# Patient Record
Sex: FEMALE | Race: WHITE | NOT HISPANIC OR LATINO | URBAN - METROPOLITAN AREA
[De-identification: names, ages, dates, MRNs, and addresses within clinical notes are randomized per-mention and may not be internally consistent; named-entity substitution may affect disease eponyms.]

---

## 2018-08-13 ENCOUNTER — TRANSFERRED RECORDS (OUTPATIENT)
Dept: HEALTH INFORMATION MANAGEMENT | Facility: CLINIC | Age: 33
End: 2018-08-13

## 2018-08-15 ENCOUNTER — TRANSFERRED RECORDS (OUTPATIENT)
Dept: HEALTH INFORMATION MANAGEMENT | Facility: CLINIC | Age: 33
End: 2018-08-15

## 2018-08-15 ENCOUNTER — MEDICAL CORRESPONDENCE (OUTPATIENT)
Dept: HEALTH INFORMATION MANAGEMENT | Facility: CLINIC | Age: 33
End: 2018-08-15

## 2018-08-28 NOTE — TELEPHONE ENCOUNTER
FUTURE VISIT INFORMATION:    Date: 8/29/18    Time: 1500    Location:  Cardiology  REFERRAL INFORMATION:    Referring provider:  HAZEL Lovell    Referring providers clinic:  [facility]    Reason for visit/diagnosis:              NOTES STATUS DETAILS   OFFICE NOTE from referring provider  Received    OFFICE NOTE from other cardiologist  N/A    DISCHARGE SUMMARY from hospital  N/A    DISCHARGE REPORT from the ER N/A    OPERATIVE REPORT  N/A    MEDICATION LIST Received    LABS     BMP requested    CBC     requested    CMP requested    TSH     requested    Lipids requested    DIAGNOSTIC PROCEDURES     EKG requested    Monitor Reports N/A     N/A    IMAGING (DISC & REPORT)      ECHO  N/A    Stress Tests N/A    Cath N/A    MRI/MRA N/A    CT/CTA N/A     N/A

## 2018-08-29 ENCOUNTER — OFFICE VISIT (OUTPATIENT)
Dept: CARDIOLOGY | Facility: CLINIC | Age: 33
End: 2018-08-29
Attending: INTERNAL MEDICINE
Payer: COMMERCIAL

## 2018-08-29 ENCOUNTER — PRE VISIT (OUTPATIENT)
Dept: CARDIOLOGY | Facility: CLINIC | Age: 33
End: 2018-08-29

## 2018-08-29 VITALS
DIASTOLIC BLOOD PRESSURE: 80 MMHG | WEIGHT: 293 LBS | SYSTOLIC BLOOD PRESSURE: 137 MMHG | OXYGEN SATURATION: 99 % | BODY MASS INDEX: 39.68 KG/M2 | HEART RATE: 89 BPM | HEIGHT: 72 IN

## 2018-08-29 DIAGNOSIS — I10 BENIGN ESSENTIAL HYPERTENSION: Primary | ICD-10-CM

## 2018-08-29 PROCEDURE — 99203 OFFICE O/P NEW LOW 30 MIN: CPT | Mod: ZP | Performed by: INTERNAL MEDICINE

## 2018-08-29 PROCEDURE — G0463 HOSPITAL OUTPT CLINIC VISIT: HCPCS | Mod: ZF

## 2018-08-29 RX ORDER — AMLODIPINE BESYLATE 10 MG/1
10 TABLET ORAL DAILY
Qty: 90 TABLET | Refills: 3 | COMMUNITY
Start: 2018-08-29 | End: 2019-02-07

## 2018-08-29 RX ORDER — SPIRONOLACTONE 100 MG/1
100 TABLET, FILM COATED ORAL DAILY
Qty: 90 TABLET | Refills: 3 | Status: SHIPPED | OUTPATIENT
Start: 2018-08-29 | End: 2018-11-28

## 2018-08-29 RX ORDER — HYDROCHLOROTHIAZIDE 25 MG/1
25 TABLET ORAL DAILY
Qty: 90 TABLET | Refills: 3 | COMMUNITY
Start: 2018-08-29 | End: 2019-02-19

## 2018-08-29 ASSESSMENT — ENCOUNTER SYMPTOMS
ORTHOPNEA: 0
INSOMNIA: 0
PANIC: 0
PALPITATIONS: 0
SYNCOPE: 0
NERVOUS/ANXIOUS: 1
SLEEP DISTURBANCES DUE TO BREATHING: 0
LIGHT-HEADEDNESS: 0
EXERCISE INTOLERANCE: 0
LEG PAIN: 0
HYPOTENSION: 0
HYPERTENSION: 1
DEPRESSION: 1
DECREASED CONCENTRATION: 1

## 2018-08-29 ASSESSMENT — PAIN SCALES - GENERAL: PAINLEVEL: NO PAIN (0)

## 2018-08-29 NOTE — NURSING NOTE
Cardiac Testing: Patient given instructions regarding echocardiogram . Discussed purpose, preparation, procedure and when to expect results reported back to the patient. Patient demonstrated understanding of this information and agreed to call with further questions or concerns.  Labs: Patient was given results of the laboratory testing obtained today. Patient was instructed to return for the next laboratory testing in 3 months. Patient demonstrated understanding of this information and agreed to call with further questions or concerns.   Med Reconcile: Reviewed and verified all current medications with the patient. The updated medication list was printed and given to the patient.  New Medication: Spironolactone 100.  Patient was educated regarding newly prescribed medication, including discussion of  the indication, administration, side effects, and when to report to MD or RN. Patient demonstrated understanding of this information and agreed to call with further questions or concerns.  Medication Change: Stop Lisinopril. Patient was educated regarding prescribed medication change, including discussion of the indication, administration, side effects, and when to report to MD or RN. Patient demonstrated understanding of this information and agreed to call with further questions or concerns.  Patient stated he understood all health information given and agreed to call with further questions or concerns.    Jesica Andrews LPN

## 2018-08-29 NOTE — NURSING NOTE
Chief Complaint   Patient presents with     Hypertension     new pt hypertension      Vitals performed.   Angela Ignacio MA
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
## 2018-08-29 NOTE — PATIENT INSTRUCTIONS
Patient Instructions:  It was a pleasure to see you in the cardiology clinic today.      If you have any questions, you can reach my nurse, Jesica Andrews LPN, at [phone].  Press Option #1 for the [facility], and then press Option #3 for nursing.    We are encouraging the use of [employer] to communicate with your HealthCare Provider    Medication Changes:  - Stop Lisinopril   - Start Spironolactone 100 mg every day.     Studies Ordered: scheduling can be completed at [phone].  - Ultrasound of your kidneys and renal arteries  - Echocardiogram    The results from today include:    Please follow up: With Dr. Sheriff in 3 months.    Sincerely,    Quintin Sheriff MD     If you have an urgent need after hours (8:00 am to 4:30 pm) please call [phone] and ask for the cardiology fellow on call.

## 2018-08-29 NOTE — MR AVS SNAPSHOT
After Visit Summary   8/29/2018    Juan Francisco Richardson    MRN: [id number]           Patient Information     Date Of Birth          1985        Visit Information        Provider Department      8/29/2018 3:00 PM Quintin Sheriff MD [facility]        Today's Diagnoses     Benign essential hypertension    -  1      Care Instructions    Patient Instructions:  It was a pleasure to see you in the cardiology clinic today.      If you have any questions, you can reach my nurse, Jesica Andrews LPN, at [phone].  Press Option #1 for the [facility], and then press Option #3 for nursing.    We are encouraging the use of [employer] to communicate with your HealthCare Provider    Medication Changes:  - Stop Lisinopril   - Start Spironolactone 100 mg every day.     Studies Ordered: scheduling can be completed at [phone].  - Ultrasound of your kidneys and renal arteries  - Echocardiogram    The results from today include:    Please follow up: With Dr. Sheriff in 3 months.    Sincerely,    Quintin Sheriff MD     If you have an urgent need after hours (8:00 am to 4:30 pm) please call [phone] and ask for the cardiology fellow on call.                    Follow-ups after your visit        Additional Services     SLEEP EVALUATION & MANAGEMENT REFERRAL - ADULT -       Please be aware that coverage of these services is subject to the terms and limitations of your health insurance plan.  Call member services at your health plan with any benefit or coverage questions.      Please bring the following to your appointment:    >>   List of current medications   >>   This referral request   >>   Any documents/labs given to you for this referral                Follow-Up with Cardiologist                 Future tests that were ordered for you today     Open Future Orders        Priority Expected Expires Ordered    Comprehensive metabolic panel Routine 9/5/2018 12/4/2018 8/29/2018     "TSH with free T4 reflex Routine 9/5/2018 8/29/2019 8/29/2018    Follow-Up with Cardiologist Routine 11/27/2018 2/25/2019 8/29/2018    SLEEP EVALUATION & MANAGEMENT REFERRAL - ADULT - Routine 9/5/2018 12/4/2018 8/29/2018    Echo Complete Routine 9/28/2018 11/27/2018 8/29/2018    US Renal Complete w Doppler Complete Routine 9/5/2018 8/29/2019 8/29/2018            Who to contact     If you have questions or need follow up information about today's clinic visit or your schedule please contact [facility] directly at [phone].  Normal or non-critical lab and imaging results will be communicated to you by [employer], letter or phone within 4 business days after the clinic has received the results. If you do not hear from us within 7 days, please contact the clinic through [employer] or phone. If you have a critical or abnormal lab result, we will notify you by phone as soon as possible.  Submit refill requests through [employer] or call your pharmacy and they will forward the refill request to us. Please allow 3 business days for your refill to be completed.          Additional Information About Your Visit        [employer] Information     [employer] gives you secure access to your electronic health record. If you see a primary care provider, you can also send messages to your care team and make appointments. If you have questions, please call your primary care clinic.  If you do not have a primary care provider, please call [phone] and they will assist you.        Care EveryWhere ID     This is your Care EveryWhere ID. This could be used by other organizations to access your [facility] medical records  [id number]        Your Vitals Were     Pulse Height Pulse Oximetry BMI (Body Mass Index)          89 2.057 m (6' 9\") 99% 51.54 kg/m2         Blood Pressure from Last 3 Encounters:   08/29/18 137/80    Weight from Last 3 Encounters:   08/29/18 (!) 218.2 kg (481 lb)                 Today's Medication Changes          These " changes are accurate as of 8/29/18  4:21 PM.  If you have any questions, ask your nurse or doctor.               Start taking these medicines.        Dose/Directions    spironolactone 100 MG tablet   Commonly known as:  ALDACTONE   Used for:  Benign essential hypertension   Started by:  Quintin Sheriff MD        Dose:  100 mg   Take 1 tablet (100 mg) by mouth daily   Quantity:  90 tablet   Refills:  3            Where to get your medicines      These medications were sent to [place] [id number]     Phone:  [phone]     spironolactone 100 MG tablet                Primary Care Provider Office Phone # Fax #    Zahira ANDREA Wolf [phone]       [address] [address]        Equal Access to Services     [facility] : Hadii aad ku hadasho Sonadira, waaxda luqadaha, qaybta kaalmada toniyalaura, gerri burroughs. So [facility] [phone].    ATENCIÓN: Si habla español, tiene a walker disposición servicios gratuitos de asistencia lingüística. [facility] [phone].    We comply with applicable federal civil rights laws and [place] laws. We do not discriminate on the basis of race, color, national origin, age, disability, sex, sexual orientation, or gender identity.            Thank you!     Thank you for choosing [facility]  for your care. Our goal is always to provide you with excellent care. Hearing back from our patients is one way we can continue to improve our services. Please take a few minutes to complete the written survey that you may receive in the mail after your visit with us. Thank you!             Your Updated Medication List - Protect others around you: Learn how to safely use, store and throw away your medicines at www.disposemymeds.org.          This list is accurate as of 8/29/18  4:21 PM.  Always use your most recent med list.                    Brand Name Dispense Instructions for use Diagnosis    spironolactone 100 MG tablet    ALDACTONE    90 tablet    Take 1 tablet (100 mg) by mouth daily    Benign essential hypertension
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
## 2018-11-27 ENCOUNTER — RADIANT APPOINTMENT (OUTPATIENT)
Dept: ULTRASOUND IMAGING | Facility: CLINIC | Age: 33
End: 2018-11-27
Attending: INTERNAL MEDICINE
Payer: COMMERCIAL

## 2018-11-27 ENCOUNTER — RADIANT APPOINTMENT (OUTPATIENT)
Dept: CARDIOLOGY | Facility: CLINIC | Age: 33
End: 2018-11-27
Payer: COMMERCIAL

## 2018-11-27 DIAGNOSIS — I10 BENIGN ESSENTIAL HYPERTENSION: ICD-10-CM

## 2018-11-28 ENCOUNTER — OFFICE VISIT (OUTPATIENT)
Dept: CARDIOLOGY | Facility: CLINIC | Age: 33
End: 2018-11-28
Attending: INTERNAL MEDICINE
Payer: COMMERCIAL

## 2018-11-28 VITALS
WEIGHT: 293 LBS | BODY MASS INDEX: 39.68 KG/M2 | SYSTOLIC BLOOD PRESSURE: 147 MMHG | DIASTOLIC BLOOD PRESSURE: 88 MMHG | HEART RATE: 102 BPM | HEIGHT: 72 IN | OXYGEN SATURATION: 99 %

## 2018-11-28 DIAGNOSIS — I10 BENIGN ESSENTIAL HYPERTENSION: Primary | ICD-10-CM

## 2018-11-28 PROCEDURE — G0463 HOSPITAL OUTPT CLINIC VISIT: HCPCS | Mod: ZF

## 2018-11-28 PROCEDURE — 99215 OFFICE O/P EST HI 40 MIN: CPT | Mod: ZP | Performed by: NURSE PRACTITIONER

## 2018-11-28 ASSESSMENT — ENCOUNTER SYMPTOMS
DECREASED CONCENTRATION: 1
NECK PAIN: 1
INSOMNIA: 0
PANIC: 1
MYALGIAS: 0
EXERCISE INTOLERANCE: 0
STIFFNESS: 0
NERVOUS/ANXIOUS: 1
ORTHOPNEA: 0
DEPRESSION: 1
BACK PAIN: 1
JOINT SWELLING: 0
ARTHRALGIAS: 0
PALPITATIONS: 0
MUSCLE CRAMPS: 0
SLEEP DISTURBANCES DUE TO BREATHING: 0
HYPOTENSION: 0
LEG PAIN: 0
HYPERTENSION: 1
LIGHT-HEADEDNESS: 1
MUSCLE WEAKNESS: 0
SYNCOPE: 0

## 2018-11-28 ASSESSMENT — PAIN SCALES - GENERAL: PAINLEVEL: MILD PAIN (3)

## 2018-11-28 NOTE — MR AVS SNAPSHOT
After Visit Summary   2018    Juan Francisco Richardson    MRN: [id number]           Patient Information     Date Of Birth          1985        Visit Information        Provider Department      2018 6:30 PM July Segura NP [facility]        Today's Diagnoses     Benign essential hypertension    -  1      Care Instructions    You were seen today in the Cardiovascular Clinic at the [facility].    Cardiology provider you saw during your visit July Segura NP.     1. Get home BP cuff and log daily BP.  2. Have labs drawn within the next week.  3. Call or message me with BP results and labs in 2 weeks - will give further recommendations based on results.   4. Please make a follow-up appointment in 3 months.     Can get BP cuff at any local pharmacy ([facility]) or [facility] or [facility] in [place].     Questions and [phone].   First press #1 for the University and then press #3 for Medical Questions to reach the Cardiology triage nurse.     On Call Cardiologist for after hours or on weekends: [phone], press option #4 and ask to speak to the on-call Cardiologist.             Follow-ups after your visit        Additional Services     Follow-Up with Cardiologist                 Your next 10 appointments already scheduled     2019  2:00 PM CST   (Arrive by 1:45 PM)   Return Visit with KIR[facility] ([facility])    [address] [place] [address]  [address] [place] [id number] [phone]              Future tests that were ordered for you today     Open Future Orders        Priority Expected Expires Ordered    Follow-Up with Cardiologist Routine 2019    Basic metabolic panel Routine 2018            Who to contact     If you have questions or need follow up information about today's clinic  "visit or your schedule please contact [facility] directly at [phone].  Normal or non-critical lab and imaging results will be communicated to you by MyChart, letter or phone within 4 business days after the clinic has received the results. If you do not hear from us within 7 days, please contact the clinic through [employer] or phone. If you have a critical or abnormal lab result, we will notify you by phone as soon as possible.  Submit refill requests through [facility] or call your pharmacy and they will forward the refill request to us. Please allow 3 business days for your refill to be completed.          Additional Information About Your Visit        [employer] Information     [facility] gives you secure access to your electronic health record. If you see a primary care provider, you can also send messages to your care team and make appointments. If you have questions, please call your primary care clinic.  If you do not have a primary care provider, please call [phone] and they will assist you.        Care EveryWhere ID     This is your Care EveryWhere ID. This could be used by other organizations to access your [facility] medical records  [id number]        Your Vitals Were     Pulse Height Pulse Oximetry BMI (Body Mass Index)          102 2.057 m (6' 9\") 99% 49.94 kg/m2         Blood Pressure from Last 3 Encounters:   11/28/18 147/88   08/29/18 137/80    Weight from Last 3 Encounters:   11/28/18 (!) 211.4 kg (466 lb)   08/29/18 (!) 218.2 kg (481 lb)               Primary Care Provider Office Phone # Fax #    Zahira Wolf [phone]       [address] [address]        Equal Access to Services     [facility] : Hadii francisco Sinha, waaxda luqadaha, qaybta kaalgerri hood. So [facility] [phone].    ATENCIÓN: Si habla español, tiene a walker disposición servicios gratuitos de asistencia lingüística. Llame " al [phone].    We comply with applicable federal civil rights laws and [place] laws. We do not discriminate on the basis of race, color, national origin, age, disability, sex, sexual orientation, or gender identity.            Thank you!     Thank you for choosing [facility]  for your care. Our goal is always to provide you with excellent care. Hearing back from our patients is one way we can continue to improve our services. Please take a few minutes to complete the written survey that you may receive in the mail after your visit with us. Thank you!             Your Updated Medication List - Protect others around you: Learn how to safely use, store and throw away your medicines at www.disposemymeds.org.          This list is accurate as of 11/28/18  6:59 PM.  Always use your most recent med list.                   Brand Name Dispense Instructions for use Diagnosis    amLODIPine 10 MG tablet    NORVASC    90 tablet    Take 1 tablet (10 mg) by mouth daily    Benign essential hypertension       BUSPIRONE HCL PO      Take by mouth 2 times daily        hydrochlorothiazide 25 MG tablet    HYDRODIURIL    90 tablet    Take 1 tablet (25 mg) by mouth daily    Benign essential hypertension       LISINOPRIL PO      Take 40 mg by mouth daily

## 2018-11-28 NOTE — LETTER
11/28/2018      RE: Juan Francisco Richardson  [address] [place] [address]       Dear Colleague,    Thank you for the opportunity to participate in the care of your patient, Juan Francisco Richardson, at the [facility]. Please see a copy of my visit note below.    Cardiology Clinic Note  November 28, 2018      HPI:  Juan Francisco Richardson is a 33 year old with headaches, obesity, severe anxiety, depressive disorder, hyperventilation, gender dysphoria and eating disorder who presents for hypertension follow-up.     He was last evaluated in clinic by Dr. Sheriff 8/2018 and was advised to discontinue lisinopril and start spironolactone 100 mg daily. Patient reports that he took the spironolactone for about 1 month but then began feeling flushed and developed worsening headaches. He also reported increased blood pressure to the 160's. He therefore discontinued the medication and resumed his lisinopril 40 mg daily.     The patient reports feeling very anxious today because he forgot to take his antianxiety medication this morning. From a cardiovascular standpoint he has been feeling okay. He denies significant chest pain, shortness of breath, orthopnea, PND or or weight gain. He states than he ran out of his hydrochlorothiazide a few weeks ago and developed lower extremity swelling which resolved after resuming his hydrochlorothiazide. He reports occasional dizziness with standing, no palpitations or syncope. He does not check his blood pressure at home as he does not have a home BP cuff. He has not had his labs checked due to severe anxiety regarding being poked.     Past medical history:  Past Medical History:   Diagnosis Date     Anxiety      Chronic headache      Depression      Gender dysphoria      Hypertension      Medications:    Current Outpatient Prescriptions on File Prior to Visit:  amLODIPine (NORVASC) 10 MG tablet Take 1 tablet (10 mg) by mouth daily  "  hydrochlorothiazide (HYDRODIURIL) 25 MG tablet Take 1 tablet (25 mg) by mouth daily   spironolactone (ALDACTONE) 100 MG tablet Take 1 tablet (100 mg) by mouth daily (Patient not taking: Reported on 11/28/2018)     No current facility-administered medications on file prior to visit.     Allergies:   Allergies   Allergen Reactions     Red Dye Itching     Family and social history:    No family history on file.    Social History     Social History     Marital status: Single     Spouse name: N/A     Number of children: N/A     Years of education: N/A     Occupational History     Not on file.     Social History Main Topics     Smoking status: Former Smoker     Types: Cigarettes     Smokeless tobacco: Never Used     Alcohol use Yes      Comment: once a month      Drug use: No     Sexual activity: Not on file     Other Topics Concern     Not on file     Social History Narrative     Review of Systems:  Skin: No skin rash or ulcers.  Eyes: No red eye.  Ears/Nose/Throat: No ear discharge, nasal congestion, sore throat or dysphagia.  Respiratory: No cough or hemoptysis.  Cardiovascular: See HPI.    Gastrointestinal: No abdominal pain, nausea, vomiting, hematemesis or melena.  Genitourinary: No increased frequency or urgency of urine. No dysuria or hematuria.  Musculoskeletal: No polyarthralgia or myalgias.  Neurologic: No headaches, seizure or focal weakness.  Psychiatric: No hallucinations.  Hematologic/Lymphatic/Immunologic: No bleeding tendency.  Endocrine: No heat or cold intolerance, abnormal facial hair or alopecia.    Vital signs:  /82 (BP Location: Right arm, Patient Position: Chair, Cuff Size: Adult Large)  Pulse 102  Ht 2.057 m (6' 9\")  Wt (!) 211.4 kg (466 lb)  SpO2 99%  BMI 49.94 kg/m2   Wt Readings from Last 2 Encounters:   11/28/18 (!) 211.4 kg (466 lb)   08/29/18 (!) 218.2 kg (481 lb)       Physical Exam:  Gen: NAD.    HEENT: No conjunctival pallor or scleral icterus, MMM. Clear oropharynx.    Neck: " No JVD. No thyroid enlargement or cervical adenopathy.    Chest: Clear to auscultation bilaterally.    CV: Normal first and second heart sounds. No murmurs or gallop appreciated.    Abdomen: Soft, non-tender, non-distended, BS+.  Ext: No edema. Warm and well perfused with normal capillary refill.    Skin: No skin rash or ulcers.  Neuro: alert, oriented and appropriately conversant.    Psych: Normal affect and speech.    Labs:    No recent labs    Diagnostics:    ECHO 11/27/18:  Interpretation Summary  Global and regional left ventricular function is normal with an EF of 55-60%.  Mild concentric wall thickening consistent with left ventricular hypertrophy  is present.  Global right ventricular function is normal.  No significant valvular abnormalities were noted.  No pericardial effusion is present.    Assessment and Plan:  This is a 33 year old with severe anxiety, depressive disorder, gender dysphoria who presents for ongoing hypertension management. The patient recently discontinued spironolactone due to flushing, headaches and inadequate BP control. He has since resumed lisinopril 40 mg daily and continues to take amlodipine 10 mg and hydrochlorothiazide 25 mg daily. BP is slightly elevated today 147/82, though he extremely anxious. He does not check home BP and has not had recent labs drawn. Recent echo shows normal biventricular function with mild LVH likely secondary to uncontrolled hypertension.     -- Continue current medication regimen for the time being  -- Recommended  24 hour ambulatory BP monitoing but the patient declined stating that he can't tolerate frequent BP checks  -- He will purchase a home BP cuff, log daily BP and call with results in 2 weeks  -- Will also have a BMP drawn at [facility] in [place] next week  -- Based on BP and labs results, will likely add spironolactone 25 mg daily vs Coreg 6.25 mg BID to current regimen    Follow-up: RTC in 3 months.       Sincerely,      July Segura, NP

## 2018-11-29 NOTE — PATIENT INSTRUCTIONS
You were seen today in the Cardiovascular Clinic at the [facility].    Cardiology provider you saw during your visit July Segura NP.     1. Get home BP cuff and log daily BP.  2. Have labs drawn within the next week.  3. Call or message me with BP results and labs in 2 weeks - will give further recommendations based on results.   4. Please make a follow-up appointment in 3 months.     Can get BP cuff at any local pharmacy ([facility]) or [facility] or [facility] in [place].     Questions and [phone].   First press #1 for the University and then press #3 for Medical Questions to reach the Cardiology triage nurse.     On Call Cardiologist for after hours or on weekends: [phone], press option #4 and ask to speak to the on-call Cardiologist.

## 2018-11-29 NOTE — PROGRESS NOTES
Cardiology Clinic Note  November 28, 2018      HPI:  Juan Francisco Richardson is a 33 year old with headaches, obesity, severe anxiety, depressive disorder, hyperventilation, gender dysphoria and eating disorder who presents for hypertension follow-up.     He was last evaluated in clinic by Dr. Sheriff 8/2018 and was advised to discontinue lisinopril and start spironolactone 100 mg daily. Patient reports that he took the spironolactone for about 1 month but then began feeling flushed and developed worsening headaches. He also reported increased blood pressure to the 160's. He therefore discontinued the medication and resumed his lisinopril 40 mg daily.     The patient reports feeling very anxious today because he forgot to take his antianxiety medication this morning. From a cardiovascular standpoint he has been feeling okay. He denies significant chest pain, shortness of breath, orthopnea, PND or or weight gain. He states than he ran out of his hydrochlorothiazide a few weeks ago and developed lower extremity swelling which resolved after resuming his hydrochlorothiazide. He reports occasional dizziness with standing, no palpitations or syncope. He does not check his blood pressure at home as he does not have a home BP cuff. He has not had his labs checked due to severe anxiety regarding being poked.     Past medical history:  Past Medical History:   Diagnosis Date     Anxiety      Chronic headache      Depression      Gender dysphoria      Hypertension      Medications:    Current Outpatient Prescriptions on File Prior to Visit:  amLODIPine (NORVASC) 10 MG tablet Take 1 tablet (10 mg) by mouth daily   hydrochlorothiazide (HYDRODIURIL) 25 MG tablet Take 1 tablet (25 mg) by mouth daily   spironolactone (ALDACTONE) 100 MG tablet Take 1 tablet (100 mg) by mouth daily (Patient not taking: Reported on 11/28/2018)     No current facility-administered medications on file prior to visit.     Allergies:   Allergies   Allergen  "Reactions     Red Dye Itching     Family and social history:    No family history on file.    Social History     Social History     Marital status: Single     Spouse name: N/A     Number of children: N/A     Years of education: N/A     Occupational History     Not on file.     Social History Main Topics     Smoking status: Former Smoker     Types: Cigarettes     Smokeless tobacco: Never Used     Alcohol use Yes      Comment: once a month      Drug use: No     Sexual activity: Not on file     Other Topics Concern     Not on file     Social History Narrative     Review of Systems:  Skin: No skin rash or ulcers.  Eyes: No red eye.  Ears/Nose/Throat: No ear discharge, nasal congestion, sore throat or dysphagia.  Respiratory: No cough or hemoptysis.  Cardiovascular: See HPI.    Gastrointestinal: No abdominal pain, nausea, vomiting, hematemesis or melena.  Genitourinary: No increased frequency or urgency of urine. No dysuria or hematuria.  Musculoskeletal: No polyarthralgia or myalgias.  Neurologic: No headaches, seizure or focal weakness.  Psychiatric: No hallucinations.  Hematologic/Lymphatic/Immunologic: No bleeding tendency.  Endocrine: No heat or cold intolerance, abnormal facial hair or alopecia.    Vital signs:  /82 (BP Location: Right arm, Patient Position: Chair, Cuff Size: Adult Large)  Pulse 102  Ht 2.057 m (6' 9\")  Wt (!) 211.4 kg (466 lb)  SpO2 99%  BMI 49.94 kg/m2   Wt Readings from Last 2 Encounters:   11/28/18 (!) 211.4 kg (466 lb)   08/29/18 (!) 218.2 kg (481 lb)       Physical Exam:  Gen: NAD.    HEENT: No conjunctival pallor or scleral icterus, MMM. Clear oropharynx.    Neck: No JVD. No thyroid enlargement or cervical adenopathy.    Chest: Clear to auscultation bilaterally.    CV: Normal first and second heart sounds. No murmurs or gallop appreciated.    Abdomen: Soft, non-tender, non-distended, BS+.  Ext: No edema. Warm and well perfused with normal capillary refill.    Skin: No skin rash or " ulcers.  Neuro: alert, oriented and appropriately conversant.    Psych: Normal affect and speech.    Labs:    No recent labs    Diagnostics:    ECHO 11/27/18:  Interpretation Summary  Global and regional left ventricular function is normal with an EF of 55-60%.  Mild concentric wall thickening consistent with left ventricular hypertrophy  is present.  Global right ventricular function is normal.  No significant valvular abnormalities were noted.  No pericardial effusion is present.    Assessment and Plan:  This is a 33 year old with severe anxiety, depressive disorder, gender dysphoria who presents for ongoing hypertension management. The patient recently discontinued spironolactone due to flushing, headaches and inadequate BP control. He has since resumed lisinopril 40 mg daily and continues to take amlodipine 10 mg and hydrochlorothiazide 25 mg daily. BP is slightly elevated today 147/82, though he extremely anxious. He does not check home BP and has not had recent labs drawn. Recent echo shows normal biventricular function with mild LVH likely secondary to uncontrolled hypertension.     -- Continue current medication regimen for the time being  -- Recommended  24 hour ambulatory BP monitoing but the patient declined stating that he can't tolerate frequent BP checks  -- He will purchase a home BP cuff, log daily BP and call with results in 2 weeks  -- Will also have a BMP drawn at [facility] in [place] next week  -- Based on BP and labs results, will likely add spironolactone 25 mg daily vs Coreg 6.25 mg BID to current regimen    Follow-up: RTC in 3 months.       Answers for HPI/ROS submitted by the patient on 11/28/2018   General Symptoms: No  Skin Symptoms: No  HENT Symptoms: No  EYE SYMPTOMS: No  HEART SYMPTOMS: Yes  LUNG SYMPTOMS: No  INTESTINAL SYMPTOMS: No  URINARY SYMPTOMS: No  REPRODUCTIVE SYMPTOMS: No  SKELETAL SYMPTOMS: Yes  BLOOD SYMPTOMS: No  NERVOUS SYSTEM SYMPTOMS: No  MENTAL HEALTH  SYMPTOMS: Yes  Chest pain or pressure: No  Fast or irregular heartbeat: No  Pain in legs with walking: No  Trouble breathing while lying down: No  Fingers or toes appear blue: No  High blood pressure: Yes  Low blood pressure: No  Fainting: No  Murmurs: No  Pacemaker: No  Varicose veins: No  Edema or swelling: Yes  Wake up at night with shortness of breath: No  Light-headedness: Yes  Exercise intolerance: No  Back pain: Yes  Muscle aches: No  Neck pain: Yes  Swollen joints: No  Joint pain: No  Bone pain: No  Muscle cramps: No  Muscle weakness: No  Joint stiffness: No  Bone fracture: No  Nervous or Anxious: Yes  Depression: Yes  Trouble sleeping: No  Trouble thinking or concentrating: Yes  Mood changes: No  Panic attacks: Yes

## 2018-11-29 NOTE — NURSING NOTE
Chief Complaint   Patient presents with     Follow Up For     34 y/o for 3 month follow up of HTN     Medications reviewed and vitals performed.  Angela Ignacio CMA

## 2019-02-05 ENCOUNTER — TRANSFERRED RECORDS (OUTPATIENT)
Dept: HEALTH INFORMATION MANAGEMENT | Facility: CLINIC | Age: 34
End: 2019-02-05

## 2019-02-06 ENCOUNTER — CARE COORDINATION (OUTPATIENT)
Dept: CARDIOLOGY | Facility: CLINIC | Age: 34
End: 2019-02-06

## 2019-02-06 RX ORDER — SPIRONOLACTONE 25 MG/1
25 TABLET ORAL DAILY
Qty: 90 TABLET | Refills: 3 | Status: CANCELLED | OUTPATIENT
Start: 2019-02-06 | End: 2020-02-06

## 2019-02-06 NOTE — PROGRESS NOTES
Called patient by phone.  No voicemail set up.  Will attempt to contact patient by phone at a later time.  If not contact will send a letter.  Labs are within normal limits.      Date: 2/6/2019    Time of Call: 4:21 PM     Diagnosis:  Hypertension     [ VORB ] Ordering provider: KERRY Escobar  Order: spironolactone 25 mg once per day     Order received by: Ramos Ruffin RN     Follow-up/additional notes: patient to start on spironolactone 25 mg once per day.  Need to know what pharmacy patient would like it sent to.  Patient should keep follow up appointment on 2/27/19 with KERRY Escobar.  Will attempt follow up at a later date.

## 2019-02-08 NOTE — PROGRESS NOTES
Multiple attempts have been made to contact the patient.  The patient does not have a voicemail that is set up, so no message was able to be left.  The patient is seeing KERRY Escobar on 2/27/19 and the addition of spironolactone will be discussed at that time.

## 2019-02-19 DIAGNOSIS — I10 BENIGN ESSENTIAL HYPERTENSION: ICD-10-CM

## 2019-02-19 RX ORDER — HYDROCHLOROTHIAZIDE 25 MG/1
25 TABLET ORAL DAILY
Qty: 90 TABLET | Refills: 0 | Status: SHIPPED | OUTPATIENT
Start: 2019-02-19 | End: 2019-05-17

## 2019-02-21 NOTE — PROGRESS NOTES
"Cardiology Clinic Note  February 22nd, 2018      HPI:  Juan Francisco Richardson is a 33 year old patient of Dr. Sheriff with a history of headaches, obesity, severe anxiety, depressive disorder, hyperventilation, gender dysphoria and eating disorder who presents for 3 month hypertension follow-up. She was last evaluated 11/2018 and was advised to have labs drawn and check home BP daily. The patient just recently had labs drawn and was started on spironolactone 3 days ago. She already reports feeling better since starting the medication and is no longer experiencing \"whooshing\" in her ears. She denies substernal chest pain, shortness of breath, orthopnea, PND or weight gain. She reports mild leg swelling that is stable. She reports occasional dizziness with standing, no palpitations or syncope. She has been checking brachial blood pressures at home which have been ranging 120-150/. She questions the accuracy of her home blood pressure readings because her diastolic has never been that high in the past. She does not follow a low sodium diet. She is compliant with her medications and denies significant side effects.    Past medical history:  Past Medical History:   Diagnosis Date     Anxiety      Chronic headache      Depression      Gender dysphoria      Hypertension      Medications:    Current Outpatient Medications on File Prior to Visit:  amLODIPine (NORVASC) 10 MG tablet Take 1 tablet (10 mg) by mouth daily   BUSPIRONE HCL PO Take by mouth 2 times daily   hydrochlorothiazide (HYDRODIURIL) 25 MG tablet Take 1 tablet (25 mg) by mouth daily   lisinopril (PRINIVIL/ZESTRIL) 40 MG tablet Take 1 tablet (40 mg) by mouth daily   spironolactone (ALDACTONE) 25 MG tablet Take 1 tablet (25 mg) by mouth daily     No current facility-administered medications on file prior to visit.     Allergies:   Allergies   Allergen Reactions     Red Dye Itching     Family and social history:    No family history on file.    Social History "     Socioeconomic History     Marital status: Single     Spouse name: Not on file     Number of children: Not on file     Years of education: Not on file     Highest education level: Not on file   Occupational History     Not on file   Social Needs     Financial resource strain: Not on file     Food insecurity:     Worry: Not on file     Inability: Not on file     Transportation needs:     Medical: Not on file     Non-medical: Not on file   Tobacco Use     Smoking status: Former Smoker     Types: Cigarettes     Smokeless tobacco: Never Used   Substance and Sexual Activity     Alcohol use: Yes     Comment: once a month      Drug use: No     Sexual activity: Not on file   Lifestyle     Physical activity:     Days per week: Not on file     Minutes per session: Not on file     Stress: Not on file   Relationships     Social connections:     Talks on phone: Not on file     Gets together: Not on file     Attends Islam service: Not on file     Active member of club or organization: Not on file     Attends meetings of clubs or organizations: Not on file     Relationship status: Not on file     Intimate partner violence:     Fear of current or ex partner: Not on file     Emotionally abused: Not on file     Physically abused: Not on file     Forced sexual activity: Not on file   Other Topics Concern     Not on file   Social History Narrative     Not on file     Review of Systems:  Skin: No skin rash or ulcers.  Eyes: No red eye.  Ears/Nose/Throat: No ear discharge, nasal congestion, sore throat or dysphagia.  Respiratory: No cough or hemoptysis.  Cardiovascular: See HPI.    Gastrointestinal: No abdominal pain, nausea, vomiting, hematemesis or melena.  Genitourinary: No increased frequency or urgency of urine. No dysuria or hematuria.  Musculoskeletal: No polyarthralgia or myalgias.  Neurologic: No headaches, seizure or focal weakness.  Psychiatric: No hallucinations.  Hematologic/Lymphatic/Immunologic: No bleeding  "tendency.  Endocrine: No heat or cold intolerance, abnormal facial hair or alopecia.    Vital signs:  Wrist /84  BP (!) 108/0 (BP Location: Right arm, Patient Position: Chair, Cuff Size: Adult Large)   Pulse 98   Ht 2.083 m (6' 10\")   Wt (!) 213.4 kg (470 lb 6.4 oz)   SpO2 98%   BMI 49.19 kg/m     Wt Readings from Last 2 Encounters:   11/28/18 (!) 211.4 kg (466 lb)   08/29/18 (!) 218.2 kg (481 lb)     Physical Exam:  Gen: NAD.    HEENT: No conjunctival pallor or scleral icterus, MMM. Clear oropharynx.    Neck: No JVD. No thyroid enlargement or cervical adenopathy.    Chest: Clear to auscultation bilaterally.    CV: Normal first and second heart sounds. No murmurs or gallop appreciated.    Abdomen: Soft, non-tender, non-distended, BS+.  Ext: No edema. Warm and well perfused with normal capillary refill.    Skin: No skin rash or ulcers.  Neuro: alert, oriented and appropriately conversant.    Psych: Normal affect and speech.    Labs:     Recent BMP:   Creatinine 1.14  Potassium 4.4    Diagnostics:    ECHO 11/27/18:  Interpretation Summary  Global and regional left ventricular function is normal with an EF of 55-60%.  Mild concentric wall thickening consistent with left ventricular hypertrophy  is present.  Global right ventricular function is normal.  No significant valvular abnormalities were noted.  No pericardial effusion is present.    Assessment and Plan:  Juan Francisco Richardson is a 33 year old patient of Dr. Sheriff with a history of headaches, obesity, severe anxiety, depressive disorder, hyperventilation, gender dysphoria and eating disorder who presents for 3 month hypertension follow-up. She is currently tolerating amlodipine 10 mg, HCTZ 25 mg, lisinopril 40 mg daily and sprionolactone 25 mg daily. She started spironolactone 3 days ago and already reports feeling better. Home brachial blood pressures have been ranging 120-150/; however, this was prior to initiation of spironolactone. I also " question the accuracy of these readings due to large upper arm circumference and inability to get a brachial blood pressure in clinic today. Her wrist blood pressure obtained in clinic today is at goal 129/84.     Hypertension, goal BP < 130/80:  -- Continue current medication regimen as spironolactone was initiated 3 days ago  -- Encouraged low sodium diet and daily exercise  -- Recommend daily BP checks on the wrist as this may be more accurate  -- Patient to have BMP drawn at [facility] in 1-2 weeks (only place she can have labs drawn due to anxiety/vasovagal syncope)  -- See PCP in 1 month - if BP remains above goal could consider switching hydrochlorothiazide to more potent thiazide such as chorthalidone 25 mg daily or consider adding carvedilol 6.25 mg BID  -- Return to cardiology clinic in 3 months for ongoing blood pressure management    Follow-up: RTC in 3 months.

## 2019-02-22 ENCOUNTER — OFFICE VISIT (OUTPATIENT)
Dept: CARDIOLOGY | Facility: CLINIC | Age: 34
End: 2019-02-22
Attending: NURSE PRACTITIONER
Payer: MEDICAID

## 2019-02-22 VITALS
DIASTOLIC BLOOD PRESSURE: 84 MMHG | SYSTOLIC BLOOD PRESSURE: 129 MMHG | OXYGEN SATURATION: 98 % | HEART RATE: 98 BPM | WEIGHT: 293 LBS | HEIGHT: 72 IN | BODY MASS INDEX: 39.68 KG/M2

## 2019-02-22 DIAGNOSIS — I10 BENIGN ESSENTIAL HYPERTENSION: Primary | ICD-10-CM

## 2019-02-22 PROCEDURE — 99214 OFFICE O/P EST MOD 30 MIN: CPT | Mod: ZP | Performed by: NURSE PRACTITIONER

## 2019-02-22 ASSESSMENT — MIFFLIN-ST. JEOR: SCORE: 3285.47

## 2019-02-22 ASSESSMENT — PAIN SCALES - GENERAL: PAINLEVEL: MODERATE PAIN (4)

## 2019-02-22 NOTE — PATIENT INSTRUCTIONS
You were seen today in the Cardiovascular Clinic at the [facility].      Cardiology Providers you saw during your visit:   KERRY Escobar    Diagnosis:   Essential Hypertension    BP today: 129/84    Recommendations:    -- Continue current medication regimen with recently added spironolactone  -- Continue to check home blood pressures daily on wrist  -- Have labs drawn at [facility] in 1-2 weeks  -- See PCP in 1 month, if additional antihypertensives are needed consider carvedilol 6.25 mg BID  -- Return to cardiology clinic in 3 months    Follow-up: Dr. Sheriff    For emergencies call 128.    For any scheduling needs, please call [phone].     Thank you for your visit today!     Please call if you have any questions or concerns.  Ramos Ruffin RN

## 2019-02-22 NOTE — LETTER
"2/22/2019      RE: Juan Francisco Richardson  [address] [place] [address]       Dear Colleague,    Thank you for the opportunity to participate in the care of your patient, Juan Francisco Richardson, at the [facility] HEART [facility]. Please see a copy of my visit note below.    Cardiology Clinic Note  February 22nd, 2018      HPI:  Juan Francisco Richardson is a 33 year old patient of Dr. Sheriff with a history of headaches, obesity, severe anxiety, depressive disorder, hyperventilation, gender dysphoria and eating disorder who presents for 3 month hypertension follow-up. She was last evaluated 11/2018 and was advised to have labs drawn and check home BP daily. The patient just recently had labs drawn and was started on spironolactone 3 days ago. She already reports feeling better since starting the medication and is no longer experiencing \"whooshing\" in her ears. She denies substernal chest pain, shortness of breath, orthopnea, PND or weight gain. She reports mild leg swelling that is stable. She reports occasional dizziness with standing, no palpitations or syncope. She has been checking brachial blood pressures at home which have been ranging 120-150/. She questions the accuracy of her home blood pressure readings because her diastolic has never been that high in the past. She does not follow a low sodium diet. She is compliant with her medications and denies significant side effects.    Past medical history:  Past Medical History:   Diagnosis Date     Anxiety      Chronic headache      Depression      Gender dysphoria      Hypertension      Medications:    Current Outpatient Medications on File Prior to Visit:  amLODIPine (NORVASC) 10 MG tablet Take 1 tablet (10 mg) by mouth daily   BUSPIRONE HCL PO Take by mouth 2 times daily   hydrochlorothiazide (HYDRODIURIL) 25 MG tablet Take 1 tablet (25 mg) by mouth daily   lisinopril (PRINIVIL/ZESTRIL) 40 MG tablet Take 1 tablet " (40 mg) by mouth daily   spironolactone (ALDACTONE) 25 MG tablet Take 1 tablet (25 mg) by mouth daily     No current facility-administered medications on file prior to visit.     Allergies:   Allergies   Allergen Reactions     Red Dye Itching     Family and social history:    No family history on file.    Social History     Socioeconomic History     Marital status: Single     Spouse name: Not on file     Number of children: Not on file     Years of education: Not on file     Highest education level: Not on file   Occupational History     Not on file   Social Needs     Financial resource strain: Not on file     Food insecurity:     Worry: Not on file     Inability: Not on file     Transportation needs:     Medical: Not on file     Non-medical: Not on file   Tobacco Use     Smoking status: Former Smoker     Types: Cigarettes     Smokeless tobacco: Never Used   Substance and Sexual Activity     Alcohol use: Yes     Comment: once a month      Drug use: No     Sexual activity: Not on file   Lifestyle     Physical activity:     Days per week: Not on file     Minutes per session: Not on file     Stress: Not on file   Relationships     Social connections:     Talks on phone: Not on file     Gets together: Not on file     Attends Roman Catholic service: Not on file     Active member of club or organization: Not on file     Attends meetings of clubs or organizations: Not on file     Relationship status: Not on file     Intimate partner violence:     Fear of current or ex partner: Not on file     Emotionally abused: Not on file     Physically abused: Not on file     Forced sexual activity: Not on file   Other Topics Concern     Not on file   Social History Narrative     Not on file     Review of Systems:  Skin: No skin rash or ulcers.  Eyes: No red eye.  Ears/Nose/Throat: No ear discharge, nasal congestion, sore throat or dysphagia.  Respiratory: No cough or hemoptysis.  Cardiovascular: See HPI.    Gastrointestinal: No abdominal  "pain, nausea, vomiting, hematemesis or melena.  Genitourinary: No increased frequency or urgency of urine. No dysuria or hematuria.  Musculoskeletal: No polyarthralgia or myalgias.  Neurologic: No headaches, seizure or focal weakness.  Psychiatric: No hallucinations.  Hematologic/Lymphatic/Immunologic: No bleeding tendency.  Endocrine: No heat or cold intolerance, abnormal facial hair or alopecia.    Vital signs:  Wrist /84  BP (!) 108/0 (BP Location: Right arm, Patient Position: Chair, Cuff Size: Adult Large)   Pulse 98   Ht 2.083 m (6' 10\")   Wt (!) 213.4 kg (470 lb 6.4 oz)   SpO2 98%   BMI 49.19 kg/m      Wt Readings from Last 2 Encounters:   11/28/18 (!) 211.4 kg (466 lb)   08/29/18 (!) 218.2 kg (481 lb)     Physical Exam:  Gen: NAD.    HEENT: No conjunctival pallor or scleral icterus, MMM. Clear oropharynx.    Neck: No JVD. No thyroid enlargement or cervical adenopathy.    Chest: Clear to auscultation bilaterally.    CV: Normal first and second heart sounds. No murmurs or gallop appreciated.    Abdomen: Soft, non-tender, non-distended, BS+.  Ext: No edema. Warm and well perfused with normal capillary refill.    Skin: No skin rash or ulcers.  Neuro: alert, oriented and appropriately conversant.    Psych: Normal affect and speech.    Labs:     Recent BMP:   Creatinine 1.14  Potassium 4.4    Diagnostics:    ECHO 11/27/18:  Interpretation Summary  Global and regional left ventricular function is normal with an EF of 55-60%.  Mild concentric wall thickening consistent with left ventricular hypertrophy  is present.  Global right ventricular function is normal.  No significant valvular abnormalities were noted.  No pericardial effusion is present.    Assessment and Plan:  Juan Francisco Richardson is a 33 year old patient of Dr. Sheriff with a history of headaches, obesity, severe anxiety, depressive disorder, hyperventilation, gender dysphoria and eating disorder who presents for 3 month hypertension follow-up. She " is currently tolerating amlodipine 10 mg, HCTZ 25 mg, lisinopril 40 mg daily and sprionolactone 25 mg daily. She started spironolactone 3 days ago and already reports feeling better. Home brachial blood pressures have been ranging 120-150/; however, this was prior to initiation of spironolactone. I also question the accuracy of these readings due to large upper arm circumference and inability to get a brachial blood pressure in clinic today. Her wrist blood pressure obtained in clinic today is at goal 129/84.     Hypertension, goal BP < 130/80:  -- Continue current medication regimen as spironolactone was initiated 3 days ago  -- Encouraged low sodium diet and daily exercise  -- Recommend daily BP checks on the wrist as this may be more accurate  -- Patient to have BMP drawn at [facility] in 1-2 weeks (only place she can have labs drawn due to anxiety/vasovagal syncope)  -- See PCP in 1 month - if BP remains above goal could consider switching hydrochlorothiazide to more potent thiazide such as chorthalidone 25 mg daily or consider adding carvedilol 6.25 mg BID  -- Return to cardiology clinic in 3 months for ongoing blood pressure management    Follow-up: RTC in 3 months.       July Segura NP

## 2019-04-19 DIAGNOSIS — I10 ESSENTIAL HYPERTENSION: ICD-10-CM

## 2019-04-23 ENCOUNTER — MYC REFILL (OUTPATIENT)
Dept: CARDIOLOGY | Facility: CLINIC | Age: 34
End: 2019-04-23

## 2019-04-23 DIAGNOSIS — I10 ESSENTIAL HYPERTENSION: ICD-10-CM

## 2019-04-23 RX ORDER — LISINOPRIL 40 MG/1
TABLET ORAL
Qty: 90 TABLET | Refills: 0 | Status: SHIPPED | OUTPATIENT
Start: 2019-04-23 | End: 2019-05-23 | Stop reason: ALTCHOICE

## 2019-04-23 RX ORDER — LISINOPRIL 40 MG/1
40 TABLET ORAL DAILY
Qty: 90 TABLET | Refills: 0 | Status: CANCELLED | OUTPATIENT
Start: 2019-04-23

## 2019-05-06 DIAGNOSIS — I10 BENIGN ESSENTIAL HYPERTENSION: ICD-10-CM

## 2019-05-08 RX ORDER — AMLODIPINE BESYLATE 10 MG/1
10 TABLET ORAL DAILY
Qty: 90 TABLET | Refills: 3 | Status: SHIPPED | OUTPATIENT
Start: 2019-05-08 | End: 2020-05-05

## 2019-05-08 NOTE — TELEPHONE ENCOUNTER
AMLODIPINE BESYLATE 10 MG TAB  Last Written Prescription Date:   2/7/2019  Last Fill Quantity: 90 ,   # refills: 0  Last Office Visit : 12/17/18  Future Office visit:  5/23/2019    Creatinine and other labs done on 2/5/2019 @ [facility] and scanned into media tab. For this refill protocol, Creatinine within 12 months is required. 2/5/2019: Creatinine =1.14

## 2019-05-17 DIAGNOSIS — I10 BENIGN ESSENTIAL HYPERTENSION: ICD-10-CM

## 2019-05-20 DIAGNOSIS — I10 ESSENTIAL HYPERTENSION: ICD-10-CM

## 2019-05-21 RX ORDER — SPIRONOLACTONE 25 MG/1
25 TABLET ORAL DAILY
Qty: 90 TABLET | Refills: 1 | Status: SHIPPED | OUTPATIENT
Start: 2019-05-21 | End: 2019-05-23

## 2019-05-21 RX ORDER — HYDROCHLOROTHIAZIDE 25 MG/1
25 TABLET ORAL DAILY
Qty: 90 TABLET | Refills: 0 | Status: SHIPPED | OUTPATIENT
Start: 2019-05-21 | End: 2019-05-23

## 2019-05-21 NOTE — TELEPHONE ENCOUNTER
hydrochlorothiazide (HYDRODIURIL) 25 MG tablet  Last Written Prescription Date:  2/19/19  Last Fill Quantity: 90,   # refills: 0  Last Office Visit : 2/22/19  Future Office visit: 5/23/19    Routing refill request to provider for review/approval because: NA, K+, creat  Orders in epic.   Not drawn.

## 2019-05-21 NOTE — TELEPHONE ENCOUNTER
Last Clinic Visit: 2/22/2019  [facility]    *required labs/protocol completed 2/5/19 and results WNL's scanned media-outside provider   Creatinine: 1.14 (0.50-1.35)  Potassium: 4.4 (3.5-5.3)  Sodium: 138 (135-146)

## 2019-05-23 ENCOUNTER — OFFICE VISIT (OUTPATIENT)
Dept: CARDIOLOGY | Facility: CLINIC | Age: 34
End: 2019-05-23
Attending: INTERNAL MEDICINE
Payer: COMMERCIAL

## 2019-05-23 VITALS
HEIGHT: 72 IN | WEIGHT: 293 LBS | OXYGEN SATURATION: 100 % | BODY MASS INDEX: 39.68 KG/M2 | HEART RATE: 79 BPM | SYSTOLIC BLOOD PRESSURE: 117 MMHG | DIASTOLIC BLOOD PRESSURE: 78 MMHG

## 2019-05-23 DIAGNOSIS — Z13.220 LIPID SCREENING: Primary | ICD-10-CM

## 2019-05-23 DIAGNOSIS — I10 BENIGN ESSENTIAL HYPERTENSION: ICD-10-CM

## 2019-05-23 DIAGNOSIS — I10 ESSENTIAL HYPERTENSION: ICD-10-CM

## 2019-05-23 PROCEDURE — 99214 OFFICE O/P EST MOD 30 MIN: CPT | Mod: ZP | Performed by: INTERNAL MEDICINE

## 2019-05-23 RX ORDER — SPIRONOLACTONE 50 MG/1
50 TABLET, FILM COATED ORAL DAILY
Qty: 90 TABLET | Refills: 3 | Status: SHIPPED | OUTPATIENT
Start: 2019-05-23 | End: 2020-06-29

## 2019-05-23 RX ORDER — HYDROCHLOROTHIAZIDE 25 MG/1
25 TABLET ORAL DAILY
Qty: 90 TABLET | Refills: 3 | Status: SHIPPED | OUTPATIENT
Start: 2019-05-23 | End: 2020-05-18

## 2019-05-23 RX ORDER — GABAPENTIN 100 MG/1
100 CAPSULE ORAL 3 TIMES DAILY
COMMUNITY

## 2019-05-23 ASSESSMENT — PAIN SCALES - GENERAL: PAINLEVEL: NO PAIN (0)

## 2019-05-23 ASSESSMENT — MIFFLIN-ST. JEOR: SCORE: 3193.6

## 2019-05-23 NOTE — LETTER
"5/23/2019      RE: Juan Francisco Richardson  [address] [place] [address]       Dear Colleague,    Thank you for the opportunity to participate in the care of your patient, Juan Francisco Richardson, at the [facility] Please see a copy of my visit note below.    HPI:     Patient Juan Francisco Richardson is a 34 year  patient with a history of headaches, obesity, severe anxiety, depressive disorder, hyperventilation, gender dysphoria and eating disorder who presents  follow-up.   Patient reports feeling better since starting the medication and is no longer experiencing \"whooshing\" in her ears. Patient denies substernal chest pain, shortness of breath, orthopnea, PND  Patient reports mild leg swelling that is stable.  Patient  is compliant with her medications and denies significant side effects.         PAST MEDICAL HISTORY:  Past Medical History:   Diagnosis Date     Anxiety      Chronic headache      Depression      Gender dysphoria      Hypertension        CURRENT MEDICATIONS:  Current Outpatient Medications   Medication Sig Dispense Refill     amLODIPine (NORVASC) 10 MG tablet Take 1 tablet (10 mg) by mouth daily 90 tablet 3     gabapentin (NEURONTIN) 100 MG capsule Take 100 mg by mouth 3 times daily       hydrochlorothiazide (HYDRODIURIL) 25 MG tablet Take 1 tablet (25 mg) by mouth daily .  Please be seen as scheduled for further refills. 90 tablet 0     lisinopril (PRINIVIL/ZESTRIL) 40 MG tablet TAKE 1 TABLET BY MOUTH EVERY DAY 90 tablet 0     spironolactone (ALDACTONE) 25 MG tablet Take 1 tablet (25 mg) by mouth daily 90 tablet 1     BUSPIRONE HCL PO Take by mouth 2 times daily         PAST SURGICAL HISTORY:  No past surgical history on file.    ALLERGIES     Allergies   Allergen Reactions     Red Dye Itching       FAMILY HISTORY:  No family history on file.    SOCIAL HISTORY:  Social History     Socioeconomic History     Marital status: Single     Spouse " "name: None     Number of children: None     Years of education: None     Highest education level: None   Occupational History     None   Social Needs     Financial resource strain: None     Food insecurity:     Worry: None     Inability: None     Transportation needs:     Medical: None     Non-medical: None   Tobacco Use     Smoking status: Former Smoker     Types: Cigarettes     Smokeless tobacco: Never Used   Substance and Sexual Activity     Alcohol use: Yes     Comment: once a month      Drug use: No     Sexual activity: None   Lifestyle     Physical activity:     Days per week: None     Minutes per session: None     Stress: None   Relationships     Social connections:     Talks on phone: None     Gets together: None     Attends Hinduism service: None     Active member of club or organization: None     Attends meetings of clubs or organizations: None     Relationship status: None     Intimate partner violence:     Fear of current or ex partner: None     Emotionally abused: None     Physically abused: None     Forced sexual activity: None   Other Topics Concern     None   Social History Narrative     None       ROS:   Constitutional: No fever, chills, or sweats. No weight gain/loss   ENT: No visual disturbance, ear ache, epistaxis, sore throat  Allergies/Immunologic: Negative.   Respiratory: No cough, hemoptysia  Cardiovascular: As per HPI  GI: No nausea, vomiting, hematemesis, melena, or hematochezia  : No urinary frequency, dysuria, or hematuria  Integument: Negative  Psychiatric: Negative  Neuro: Negative  Endocrinology: Negative   Musculoskeletal: Negative    EXAM:  /78   Pulse 79   Ht 2.07 m (6' 9.5\")   Wt (!) 205.5 kg (453 lb)   SpO2 100%   BMI 47.95 kg/m     In general, the patient is a pleasant male in no apparent distress.    HEENT: NC/AT.  PERRLA.  EOMI.  Sclerae white, not injected.  Nares clear.  Pharynx without erythema or exudate.  Dentition intact.    Neck: No adenopathy.  No " thyromegaly. Carotids +4/4 bilaterally without bruits.  No jugular venous distension.   Heart: RRR. Normal S1, S2 splits physiologically. No murmur, rub, click, or gallop. The PMI is in the 5th ICS in the midclavicular line. There is no heave.    Lungs: CTA.  No ronchi, wheezes, rales.  No dullness to percussion.   Abdomen: Soft, nontender, nondistended. No organomegaly.  No bruits.   Extremities: No clubbing, cyanosis, or edema.  The pulses are +4/4 at the radial, brachial, femoral, popliteal, DP, and PT sites bilaterally.  No bruits are noted.  Neurologic: Alert and oriented to person/place/time, normal speech, gait and affect  Skin: No petechiae, purpura or rash.    Labs:  LIPID RESULTS:  No results found for: CHOL, HDL, LDL, TRIG, CHOLHDLRATIO, NHDL    LIVER ENZYME RESULTS:  No results found for: AST, ALT    CBC RESULTS:  No results found for: WBC, RBC, HGB, HCT, MCV, MCH, MCHC, RDW, PLT    BMP RESULTS:  No results found for: NA, POTASSIUM, CHLORIDE, CO2, ANIONGAP, GLC, BUN, CR, GFRESTIMATED, GFRESTBLACK, RAÚL     A1C RESULTS:  No results found for: A1C    INR RESULTS:  No results found for: INR    Procedures:      Assessment and Plan:     We discussed the results with patient:  We discussed the importance of a heart healthy lifestyle and weight loss.    We discussed  following changes in her medication and gave following written instructions:    Medication Changes:   - Increase Spironolactone to 50 mg every day  - Stop Lisinopril - because had some irritation in throat and a cough from time to time  - Start Amlodipine 10 mg at bedtime.  If SBP (top number) is less than 120 mmHg, only take 5 mg.     Recommendations:  Complete labs in one week at [facility].     Studies Ordered: None     The results from today include: None.     Please follow up: With Dr. Sheriff in one year with fasting labs prior    Quintin Sheriff MD, PhD  Professor of Medicine  Division of Cardiology      CC  Patient Care  Team:  Zahira Wolf NP as PCP - General  Zahira Wolf NP as Referring Physician  Juan Forte MD as MD (Cardiology)  [place]

## 2019-05-23 NOTE — PATIENT INSTRUCTIONS
Patient Instructions:  It was a pleasure to see you in the cardiology clinic today.      If you have any questions, you can reach my nurse, Jesica Andrews LPN, at [phone].  Press Option #1 for the [facility], and then press Option #3 for nursing.    We are encouraging the use of [employer] to communicate with your HealthCare Provider    Medication Changes:   - Increase Spironolactone to 50 mg every day  - Stop Lisinopril  - Start Amlodipine 10 mg at bedtime.  If SBP (top number) is less than 120 mmHg, only take 5 mg.    Recommendations:  Complete labs in one week at [facility].    Studies Ordered: None    The results from today include: None.    Please follow up: With Dr. Sheriff in one year with fasting labs prior.    Sincerely,    Quintin Sheriff MD     If you have an urgent need after hours (8:00 am to 4:30 pm) please call [phone] and ask for the cardiology fellow on call.  
progress slowly

## 2019-05-23 NOTE — PROGRESS NOTES
"HPI:     Patient Juan Francisco Richardson is a 34 year  patient with a history of headaches, obesity, severe anxiety, depressive disorder, hyperventilation, gender dysphoria and eating disorder who presents  follow-up.   Patient reports feeling better since starting the medication and is no longer experiencing \"whooshing\" in her ears. Patient denies substernal chest pain, shortness of breath, orthopnea, PND  Patient reports mild leg swelling that is stable.  Patient  is compliant with her medications and denies significant side effects.         PAST MEDICAL HISTORY:  Past Medical History:   Diagnosis Date     Anxiety      Chronic headache      Depression      Gender dysphoria      Hypertension        CURRENT MEDICATIONS:  Current Outpatient Medications   Medication Sig Dispense Refill     amLODIPine (NORVASC) 10 MG tablet Take 1 tablet (10 mg) by mouth daily 90 tablet 3     gabapentin (NEURONTIN) 100 MG capsule Take 100 mg by mouth 3 times daily       hydrochlorothiazide (HYDRODIURIL) 25 MG tablet Take 1 tablet (25 mg) by mouth daily .  Please be seen as scheduled for further refills. 90 tablet 0     lisinopril (PRINIVIL/ZESTRIL) 40 MG tablet TAKE 1 TABLET BY MOUTH EVERY DAY 90 tablet 0     spironolactone (ALDACTONE) 25 MG tablet Take 1 tablet (25 mg) by mouth daily 90 tablet 1     BUSPIRONE HCL PO Take by mouth 2 times daily         PAST SURGICAL HISTORY:  No past surgical history on file.    ALLERGIES     Allergies   Allergen Reactions     Red Dye Itching       FAMILY HISTORY:  No family history on file.    SOCIAL HISTORY:  Social History     Socioeconomic History     Marital status: Single     Spouse name: None     Number of children: None     Years of education: None     Highest education level: None   Occupational History     None   Social Needs     Financial resource strain: None     Food insecurity:     Worry: None     Inability: None     Transportation needs:     Medical: None     Non-medical: None   Tobacco Use " "    Smoking status: Former Smoker     Types: Cigarettes     Smokeless tobacco: Never Used   Substance and Sexual Activity     Alcohol use: Yes     Comment: once a month      Drug use: No     Sexual activity: None   Lifestyle     Physical activity:     Days per week: None     Minutes per session: None     Stress: None   Relationships     Social connections:     Talks on phone: None     Gets together: None     Attends [employer] service: None     Active member of club or organization: None     Attends meetings of clubs or organizations: None     Relationship status: None     Intimate partner violence:     Fear of current or ex partner: None     Emotionally abused: None     Physically abused: None     Forced sexual activity: None   Other Topics Concern     None   Social History Narrative     None       ROS:   Constitutional: No fever, chills, or sweats. No weight gain/loss   ENT: No visual disturbance, ear ache, epistaxis, sore throat  Allergies/Immunologic: Negative.   Respiratory: No cough, hemoptysia  Cardiovascular: As per HPI  GI: No nausea, vomiting, hematemesis, melena, or hematochezia  : No urinary frequency, dysuria, or hematuria  Integument: Negative  Psychiatric: Negative  Neuro: Negative  Endocrinology: Negative   Musculoskeletal: Negative    EXAM:  /78   Pulse 79   Ht 2.07 m (6' 9.5\")   Wt (!) 205.5 kg (453 lb)   SpO2 100%   BMI 47.95 kg/m    In general, the patient is a pleasant male in no apparent distress.    HEENT: NC/AT.  PERRLA.  EOMI.  Sclerae white, not injected.  Nares clear.  Pharynx without erythema or exudate.  Dentition intact.    Neck: No adenopathy.  No thyromegaly. Carotids +4/4 bilaterally without bruits.  No jugular venous distension.   Heart: RRR. Normal S1, S2 splits physiologically. No murmur, rub, click, or gallop. The PMI is in the 5th ICS in the midclavicular line. There is no heave.    Lungs: CTA.  No ronchi, wheezes, rales.  No dullness to percussion.   Abdomen: Soft, " nontender, nondistended. No organomegaly.  No bruits.   Extremities: No clubbing, cyanosis, or edema.  The pulses are +4/4 at the radial, brachial, femoral, popliteal, DP, and PT sites bilaterally.  No bruits are noted.  Neurologic: Alert and oriented to person/place/time, normal speech, gait and affect  Skin: No petechiae, purpura or rash.    Labs:  LIPID RESULTS:  No results found for: CHOL, HDL, LDL, TRIG, CHOLHDLRATIO, NHDL    LIVER ENZYME RESULTS:  No results found for: AST, ALT    CBC RESULTS:  No results found for: WBC, RBC, HGB, HCT, MCV, MCH, MCHC, RDW, PLT    BMP RESULTS:  No results found for: NA, POTASSIUM, CHLORIDE, CO2, ANIONGAP, GLC, BUN, CR, GFRESTIMATED, GFRESTBLACK, RAÚL     A1C RESULTS:  No results found for: A1C    INR RESULTS:  No results found for: INR    Procedures:      Assessment and Plan:     We discussed the results with patient:  We discussed the importance of a heart healthy lifestyle and weight loss.    We discussed  following changes in her medication and gave following written instructions:    Medication Changes:   - Increase Spironolactone to 50 mg every day  - Stop Lisinopril - because had some irritation in throat and a cough from time to time  - Start Amlodipine 10 mg at bedtime.  If SBP (top number) is less than 120 mmHg, only take 5 mg.     Recommendations:  Complete labs in one week at [facility].     Studies Ordered: None     The results from today include: None.     Please follow up: With Dr. Sheriff in one year with fasting labs prior    Quintin Sheriff MD, PhD  Professor of Medicine  Division of Cardiology      CC  Patient Care Team:  Zahira Wolf NP as PCP - General  Zahira Wolf NP as Referring Physician  Juan Forte MD as MD (Cardiology)  [place]

## 2019-05-23 NOTE — NURSING NOTE
Labs: Patient was given results of the laboratory testing obtained today. Patient was instructed to return for the next laboratory testing in one week and again in one year. Patient demonstrated understanding of this information and agreed to call with further questions or concerns.   Med Reconcile: Reviewed and verified all current medications with the patient. The updated medication list was printed and given to the patient.  Return Appointment: Patient given instructions regarding scheduling next clinic visit. Patient demonstrated understanding of this information and agreed to call with further questions or concerns.  Medication Change: Patient was educated regarding prescribed medication change, including discussion of the indication, administration, side effects, and when to report to MD or RN. Patient demonstrated understanding of this information and agreed to call with further questions or concerns.  Patient stated he understood all health information given and agreed to call with further questions or concerns.    Jesica Andrews LPN

## 2019-08-12 ENCOUNTER — DOCUMENTATION ONLY (OUTPATIENT)
Dept: CARDIOLOGY | Facility: CLINIC | Age: 34
End: 2019-08-12

## 2019-08-12 NOTE — PROGRESS NOTES
Informed Jesica Andrews who is Dr. Sheriff nurse about this message. Jesica stated that she will take care of this for the patient.    Kimberly Leavitt MA  Cardiology [facility]

## 2019-11-18 DIAGNOSIS — I10 BENIGN ESSENTIAL HYPERTENSION: ICD-10-CM

## 2019-11-19 NOTE — TELEPHONE ENCOUNTER
"  LISINOPRIL 40 MG TABLET     Last Written Prescription Date:  8/12/2019  Last Fill Quantity: 90,   # refills: 0  Last Office Visit : 5/23/2019  Future Office visit:  None    Routing refill request to provider for review/approval because:  No Cr and K on file. Ordered and reviewed as below Anat from Herl[facility] OLIVARES, no results in CareEverywhere or media tab. PCP @ [facility].  8/11/2019:\"   Were you ever able to complete labs after [facility]?  Dr Sheriff was requesting to have a basic metabolic panel drawn.  If you haven't done so, we will need to have that completed; otherwise, if you could request results be sent to [phone]\"      "

## 2019-11-20 ENCOUNTER — MYC REFILL (OUTPATIENT)
Dept: CARDIOLOGY | Facility: CLINIC | Age: 34
End: 2019-11-20

## 2019-11-20 DIAGNOSIS — I10 BENIGN ESSENTIAL HYPERTENSION: ICD-10-CM

## 2019-11-20 RX ORDER — LISINOPRIL 40 MG/1
40 TABLET ORAL DAILY
Qty: 90 TABLET | Refills: 0 | Status: CANCELLED | OUTPATIENT
Start: 2019-11-20

## 2019-11-22 RX ORDER — LISINOPRIL 40 MG/1
40 TABLET ORAL DAILY
Qty: 90 TABLET | Refills: 1 | Status: SHIPPED | OUTPATIENT
Start: 2019-11-22 | End: 2020-05-18

## 2020-01-09 LAB
BUN SERPL-MCNC: 20 MG/DL
CALCIUM SERPL-MCNC: 10.1 MG/DL
CHLORIDE SERPLBLD-SCNC: 100 MMOL/L
CO2 SERPL-SCNC: 27 MMOL/L
CREAT SERPL-MCNC: 1.27 MG/DL
GFR SERPL CREATININE-BSD FRML MDRD: 74 ML/MIN/1.73M2
GLUCOSE SERPL-MCNC: 83 MG/DL (ref 70–99)
POTASSIUM SERPL-SCNC: 4.8 MMOL/L
SODIUM SERPL-SCNC: 135 MMOL/L

## 2020-03-11 ENCOUNTER — HEALTH MAINTENANCE LETTER (OUTPATIENT)
Age: 35
End: 2020-03-11

## 2020-05-05 ENCOUNTER — MYC REFILL (OUTPATIENT)
Dept: CARDIOLOGY | Facility: CLINIC | Age: 35
End: 2020-05-05

## 2020-05-05 DIAGNOSIS — I10 BENIGN ESSENTIAL HYPERTENSION: ICD-10-CM

## 2020-05-05 RX ORDER — AMLODIPINE BESYLATE 10 MG/1
TABLET ORAL
Qty: 90 TABLET | Refills: 3 | OUTPATIENT
Start: 2020-05-05

## 2020-05-05 RX ORDER — AMLODIPINE BESYLATE 10 MG/1
10 TABLET ORAL DAILY
Qty: 90 TABLET | Refills: 0 | Status: SHIPPED | OUTPATIENT
Start: 2020-05-05 | End: 2020-08-05

## 2020-05-18 DIAGNOSIS — I10 BENIGN ESSENTIAL HYPERTENSION: ICD-10-CM

## 2020-05-18 RX ORDER — HYDROCHLOROTHIAZIDE 25 MG/1
25 TABLET ORAL DAILY
Qty: 90 TABLET | Refills: 0 | Status: SHIPPED | OUTPATIENT
Start: 2020-05-18 | End: 2020-09-21

## 2020-05-18 RX ORDER — LISINOPRIL 40 MG/1
40 TABLET ORAL DAILY
Qty: 90 TABLET | Refills: 0 | Status: SHIPPED | OUTPATIENT
Start: 2020-05-18 | End: 2020-08-20

## 2020-05-18 NOTE — TELEPHONE ENCOUNTER
[facility] Call Center    Phone Message    May a detailed message be left on voicemail: yes     Reason for Call: Medication Question or concern regarding medication   Prescription Clarification  Name of Medication: Lisopril (PRINIVIL/ZESTRIL) 40 MG tablet, hydrochlorothiazide (HYDRODIURIL) 25 MG, & spironolactone (ALDACTONE) 50 MG tablet   Prescribing Provider: Quintin Singh MD   Pharmacy: [place] [id number]  [phone]   What on the order needs clarification? We need to clarify dosage on: spironolactone (ALDACTONE) 50 MG tablet  90 tablet  3  5/23/2019   No   Sig - Route: Take 1 tablet (50 mg) by mouth daily - Oral- Pt has been taking 1/2 tablet 25 mg daily. Cutting in half.     Order the other two listed above.         Action Taken: Message routed to:  [facility] Cardiology    Travel Screening: Not Applicable                                                                    ;

## 2020-06-18 ENCOUNTER — VIRTUAL VISIT (OUTPATIENT)
Dept: CARDIOLOGY | Facility: CLINIC | Age: 35
End: 2020-06-18
Attending: INTERNAL MEDICINE
Payer: COMMERCIAL

## 2020-06-18 ENCOUNTER — TELEPHONE (OUTPATIENT)
Dept: CARDIOLOGY | Facility: CLINIC | Age: 35
End: 2020-06-18

## 2020-06-18 DIAGNOSIS — Z13.220 LIPID SCREENING: ICD-10-CM

## 2020-06-18 DIAGNOSIS — I10 BENIGN ESSENTIAL HYPERTENSION: Primary | ICD-10-CM

## 2020-06-18 PROCEDURE — 99214 OFFICE O/P EST MOD 30 MIN: CPT | Mod: 95 | Performed by: INTERNAL MEDICINE

## 2020-06-18 RX ORDER — ESTRADIOL 0.1 MG/D
FILM, EXTENDED RELEASE TRANSDERMAL
COMMUNITY
Start: 2020-05-18 | End: 2021-11-01

## 2020-06-18 RX ORDER — HYDROCODONE BITARTRATE AND ACETAMINOPHEN 5; 325 MG/1; MG/1
TABLET ORAL
COMMUNITY
Start: 2020-06-10 | End: 2021-11-01

## 2020-06-18 RX ORDER — IBUPROFEN 800 MG/1
TABLET, FILM COATED ORAL
Refills: 0 | COMMUNITY
Start: 2019-08-20

## 2020-06-18 ASSESSMENT — PAIN SCALES - GENERAL: PAINLEVEL: NO PAIN (0)

## 2020-06-18 NOTE — PROGRESS NOTES
"Juan Francisco Richardson is a 35 year old adult who is being evaluated via a billable video visit.      The patient has been notified of following:     \"This video visit will be conducted via a call between you and your physician/provider. We have found that certain health care needs can be provided without the need for an in-person physical exam.  This service lets us provide the care you need with a video conversation.  If a prescription is necessary we can send it directly to your pharmacy.  If lab work is needed we can place an order for that and you can then stop by our lab to have the test done at a later time.    Video visits are billed at different rates depending on your insurance coverage.  Please reach out to your insurance provider with any questions.    If during the course of the call the physician/provider feels a video visit is not appropriate, you will not be charged for this service.\"    Patient has given verbal consent for Video visit? Yes    Will anyone else be joining your video visit? No   Patient will be doing the video visit off of MyChart  AVS: MyChart         Medications were reconciled.     Sarai Mayberry CMA    2:23 PM      Start video: 2.30 pm   Stop video: 3.00 pm    Location:  Patient: home  Dr Sheriff: [facility] office    Video: [facility]    HPI:      Patient Juan Francisco Richardson is a 35 year  patient with a history of headaches, obesity, severe anxiety, depressive disorder, hyperventilation, gender dysphoria and eating disorder who presents  follow-up.   Patient reports feeling better. Patient denies substernal chest pain, shortness of breath, orthopnea, PND  Patient reports mild leg swelling that is stable.  Patient  is compliant with her medications and denies significant side effects.           PAST MEDICAL HISTORY:  Past Medical History        Past Medical History:   Diagnosis Date     Anxiety       Chronic headache       Depression       Gender dysphoria       Hypertension             CURRENT " MEDICATIONS:  Current Outpatient Prescriptions          Current Outpatient Medications   Medication Sig Dispense Refill     amLODIPine (NORVASC) 10 MG tablet Take 1 tablet (10 mg) by mouth daily 90 tablet 3     gabapentin (NEURONTIN) 100 MG capsule Take 100 mg by mouth 3 times daily         hydrochlorothiazide (HYDRODIURIL) 25 MG tablet Take 1 tablet (25 mg) by mouth daily .  Please be seen as scheduled for further refills. 90 tablet 0     lisinopril (PRINIVIL/ZESTRIL) 40 MG tablet TAKE 1 TABLET BY MOUTH EVERY DAY 90 tablet 0     spironolactone (ALDACTONE) 25 MG tablet Take 1 tablet (25 mg) by mouth daily 90 tablet 1     BUSPIRONE HCL PO Take by mouth 2 times daily               PAST SURGICAL HISTORY:  None           ALLERGIES          Allergies   Allergen Reactions     Red Dye Itching        FAMILY HISTORY:  Family History   No family history on file.   Patient has no contact and does want to have contact with his family.     SOCIAL HISTORY:  Social History            Socioeconomic History     Marital status: Single       Spouse name: None     Number of children: None     Years of education: None     Highest education level: None   Occupational History     None   Social Needs     Financial resource strain: None     Food insecurity:       Worry: None       Inability: None     Transportation needs:       Medical: None       Non-medical: None   Tobacco Use     Smoking status: Former Smoker       Types: Cigarettes     Smokeless tobacco: Never Used   Substance and Sexual Activity     Alcohol use: Yes       Comment: once a month      Drug use: No     Sexual activity: None   Lifestyle     Physical activity:       Days per week: None       Minutes per session: None     Stress: None   Relationships     Social connections:       Talks on phone: None       Gets together: None       Attends Sabianist service: None       Active member of club or organization: None       Attends meetings of clubs or organizations: None        Relationship status: None     Intimate partner violence:       Fear of current or ex partner: None       Emotionally abused: None       Physically abused: None       Forced sexual activity: None   Other Topics Concern     None   Social History Narrative     None         ROS:   Constitutional: No fever, chills, or sweats. No weight gain/loss   ENT: No visual disturbance, ear ache, epistaxis, sore throat  Allergies/Immunologic: Negative.   Respiratory: No cough, hemoptysia  Cardiovascular: As per HPI  GI: No nausea, vomiting, hematemesis, melena, or hematochezia  : No urinary frequency, dysuria, or hematuria  Integument: Negative  Psychiatric: Negative  Neuro: Negative  Endocrinology: Negative   Musculoskeletal: Negative           Assessment and Plan:    We discussed the results with patient.  We discussed the importance of a heart healthy diet and lifestyle.  We continue with same medical regimen.  Please follow up: With Dr. Sheriff in one year with fasting labs prior.  Contact [place] prior to your appointment if you would like to complete with the [facility].      Quintin Sheriff MD, PhD  Professor of Medicine  Division of Cardiology

## 2020-06-18 NOTE — PATIENT INSTRUCTIONS
Patient Instructions:  It was a pleasure to see you in the cardiology clinic today.      If you have any questions, you can reach my nurse, Jesica RIVERS LPN, at [phone].  Press Option #1 for the [facility], and then press Option #4 for nursing.    We are encouraging the use of [employer] to communicate with your HealthCare Provider    Medication Changes: None.    Recommendations: None.    Studies Ordered: None.    The results from today include: None.    Please follow up: With Dr. Sheriff in one year with fasting labs prior.  Contact Jesica prior to your appointment if you would like to complete with the [facility].    Sincerely,    Quintin Sheriff MD     If you have an urgent need after hours (8:00 am to 4:30 pm) please call [phone] and ask for the cardiology fellow on call.

## 2020-06-18 NOTE — LETTER
6/18/2020      RE: Juan Francisco Richardson  [address]  [place] [address]       Dear Colleague,    Thank you for the opportunity to participate in the care of your patient, Juan Francisco Richardson, at the [facility]. Please see a copy of my visit note below.    Juan Francisco Richardson is a 35 year old adult who is being evaluated via a billable video visit.        HPI:      Patient Juan Francisco Richardson is a 35 year  patient with a history of headaches, obesity, severe anxiety, depressive disorder, hyperventilation, gender dysphoria and eating disorder who presents  follow-up.   Patient reports feeling better. Patient denies substernal chest pain, shortness of breath, orthopnea, PND  Patient reports mild leg swelling that is stable.  Patient  is compliant with her medications and denies significant side effects.           PAST MEDICAL HISTORY:  Past Medical History        Past Medical History:   Diagnosis Date     Anxiety       Chronic headache       Depression       Gender dysphoria       Hypertension             CURRENT MEDICATIONS:  Current Outpatient Prescriptions          Current Outpatient Medications   Medication Sig Dispense Refill     amLODIPine (NORVASC) 10 MG tablet Take 1 tablet (10 mg) by mouth daily 90 tablet 3     gabapentin (NEURONTIN) 100 MG capsule Take 100 mg by mouth 3 times daily         hydrochlorothiazide (HYDRODIURIL) 25 MG tablet Take 1 tablet (25 mg) by mouth daily .  Please be seen as scheduled for further refills. 90 tablet 0     lisinopril (PRINIVIL/ZESTRIL) 40 MG tablet TAKE 1 TABLET BY MOUTH EVERY DAY 90 tablet 0     spironolactone (ALDACTONE) 25 MG tablet Take 1 tablet (25 mg) by mouth daily 90 tablet 1     BUSPIRONE HCL PO Take by mouth 2 times daily               PAST SURGICAL HISTORY:  None           ALLERGIES          Allergies   Allergen Reactions     Red Dye Itching        FAMILY HISTORY:  Family History   No family history on  file.   Patient has no contact and does want to have contact with his family.     SOCIAL HISTORY:  Social History            Socioeconomic History     Marital status: Single       Spouse name: None     Number of children: None     Years of education: None     Highest education level: None   Occupational History     None   Social Needs     Financial resource strain: None     Food insecurity:       Worry: None       Inability: None     Transportation needs:       Medical: None       Non-medical: None   Tobacco Use     Smoking status: Former Smoker       Types: Cigarettes     Smokeless tobacco: Never Used   Substance and Sexual Activity     Alcohol use: Yes       Comment: once a month      Drug use: No     Sexual activity: None   Lifestyle     Physical activity:       Days per week: None       Minutes per session: None     Stress: None   Relationships     Social connections:       Talks on phone: None       Gets together: None       Attends Rastafarian service: None       Active member of club or organization: None       Attends meetings of clubs or organizations: None       Relationship status: None     Intimate partner violence:       Fear of current or ex partner: None       Emotionally abused: None       Physically abused: None       Forced sexual activity: None   Other Topics Concern     None   Social History Narrative     None         ROS:   Constitutional: No fever, chills, or sweats. No weight gain/loss   ENT: No visual disturbance, ear ache, epistaxis, sore throat  Allergies/Immunologic: Negative.   Respiratory: No cough, hemoptysia  Cardiovascular: As per HPI  GI: No nausea, vomiting, hematemesis, melena, or hematochezia  : No urinary frequency, dysuria, or hematuria  Integument: Negative  Psychiatric: Negative  Neuro: Negative  Endocrinology: Negative   Musculoskeletal: Negative           Assessment and Plan:    We discussed the results with patient.  We discussed the importance of a heart healthy diet and  lifestyle.  We continue with same medical regimen.  Please follow up: With Dr. Sheriff in one year with fasting labs prior.  Contact [place] prior to your appointment if you would like to complete with the [facility].      Quintin Sheriff MD, PhD  Professor of Medicine  Division of Cardiology      Please do not hesitate to contact me if you have any questions/concerns.     Sincerely,     Quintin Sheriff MD

## 2020-06-18 NOTE — TELEPHONE ENCOUNTER
[facility] Call Center    Phone Message    May a detailed message be left on voicemail: yes     Reason for Call: Medication Question or concern regarding medication   Prescription Clarification  Name of Medication: spironolactone (ALDACTONE) 50 MG tablet   Prescribing Provider: Dr. Quintin Sheriff   Pharmacy: [facility]   What on the order needs clarification? Pt did  the script from pharmacy, however, pt was expecting the dosage to be at 25 MG?  Please call pharmacy back. Thank you.          Action Taken: Message routed to:  [facility]  Cardiology    Travel Screening: Not Applicable

## 2020-06-22 NOTE — TELEPHONE ENCOUNTER
Called and left VM for Pt requesting a return call to clinic to discuss medication doses.  Clinic telephone provided.  Also noted mychart would be sent.    Jesica Andrews LPN

## 2020-08-03 DIAGNOSIS — I10 BENIGN ESSENTIAL HYPERTENSION: ICD-10-CM

## 2020-08-05 ENCOUNTER — MYC REFILL (OUTPATIENT)
Dept: CARDIOLOGY | Facility: CLINIC | Age: 35
End: 2020-08-05

## 2020-08-05 DIAGNOSIS — I10 BENIGN ESSENTIAL HYPERTENSION: ICD-10-CM

## 2020-08-06 RX ORDER — AMLODIPINE BESYLATE 10 MG/1
10 TABLET ORAL DAILY
Qty: 90 TABLET | Refills: 3 | Status: SHIPPED | OUTPATIENT
Start: 2020-08-06 | End: 2021-07-31

## 2020-08-06 RX ORDER — AMLODIPINE BESYLATE 10 MG/1
10 TABLET ORAL DAILY
Qty: 90 TABLET | Refills: 0 | OUTPATIENT
Start: 2020-08-06

## 2020-08-06 NOTE — TELEPHONE ENCOUNTER
amLODIPine (NORVASC) 10 MG tablet    Last Written Prescription Date:  5/5/20  Last Fill Quantity: 90,   # refills: 0  Last Office Visit : 6/18/20 Virtual  Future Office visit: With Dr. Sheriff in one year with fasting labs prior    Cr done 1/6/20  BP due      Refilled per protocol

## 2020-08-20 ENCOUNTER — MYC REFILL (OUTPATIENT)
Dept: CARDIOLOGY | Facility: CLINIC | Age: 35
End: 2020-08-20

## 2020-08-20 DIAGNOSIS — I10 BENIGN ESSENTIAL HYPERTENSION: ICD-10-CM

## 2020-08-21 RX ORDER — LISINOPRIL 40 MG/1
40 TABLET ORAL DAILY
Qty: 90 TABLET | Refills: 1 | Status: SHIPPED | OUTPATIENT
Start: 2020-08-21 | End: 2021-02-22

## 2020-08-21 NOTE — TELEPHONE ENCOUNTER
"  lisinopril (ZESTRIL) 40 MG tablet   Last Written Prescription Date:  5/18/20  Last Fill Quantity: 90,   # refills: 0  Last Office Visit : 6/18/20 virtual  Future Office visit:  None    \"With Dr. Sheriff in one year\"        Last bp < 140/90 ,last visit virtual    90 day 1rf  SENT to pharmacy  "

## 2020-08-25 RX ORDER — LISINOPRIL 40 MG/1
TABLET ORAL
Qty: 90 TABLET | Refills: 0 | OUTPATIENT
Start: 2020-08-25

## 2020-09-17 ENCOUNTER — MYC REFILL (OUTPATIENT)
Dept: CARDIOLOGY | Facility: CLINIC | Age: 35
End: 2020-09-17

## 2020-09-17 DIAGNOSIS — I10 BENIGN ESSENTIAL HYPERTENSION: Primary | ICD-10-CM

## 2020-09-17 DIAGNOSIS — I10 BENIGN ESSENTIAL HYPERTENSION: ICD-10-CM

## 2020-09-21 RX ORDER — HYDROCHLOROTHIAZIDE 25 MG/1
25 TABLET ORAL DAILY
Qty: 90 TABLET | Refills: 1 | Status: SHIPPED | OUTPATIENT
Start: 2020-09-21 | End: 2021-03-21

## 2020-09-22 RX ORDER — HYDROCHLOROTHIAZIDE 25 MG/1
25 TABLET ORAL DAILY
Qty: 90 TABLET | Refills: 0 | OUTPATIENT
Start: 2020-09-22

## 2020-09-24 DIAGNOSIS — Z79.899 ENCOUNTER FOR MEDICATION MANAGEMENT: Primary | ICD-10-CM

## 2020-09-28 DIAGNOSIS — I10 BENIGN ESSENTIAL HYPERTENSION: ICD-10-CM

## 2020-09-28 DIAGNOSIS — Z13.220 LIPID SCREENING: ICD-10-CM

## 2020-09-28 DIAGNOSIS — Z79.899 ENCOUNTER FOR MEDICATION MANAGEMENT: ICD-10-CM

## 2020-09-28 LAB — INTERPRETATION ECG - MUSE: NORMAL

## 2020-10-30 ENCOUNTER — TELEPHONE (OUTPATIENT)
Dept: CARDIOLOGY | Facility: CLINIC | Age: 35
End: 2020-10-30

## 2020-10-30 NOTE — TELEPHONE ENCOUNTER
[facility] Call Center    Phone Message    May a detailed message be left on voicemail: yes     Reason for Call: Other: Minna from [facility] calling reporting that she plans on starting the pt on Duloxetine, and with the recent EKG's she wants to confirm that this will be okay. Please give her a call back.     Action Taken: Message routed to:  [facility] cardio    Travel Screening: Not Applicable

## 2020-11-04 NOTE — TELEPHONE ENCOUNTER
Called and left message with [facility]leen.  Noted Dr Sheriff reviewed EKG and has no objections with Pt starting duloxetine.  Requested a return call to clinic to discuss further if needed.  Clinic telephone provided.    Jesica Andrews LPN

## 2021-01-03 ENCOUNTER — HEALTH MAINTENANCE LETTER (OUTPATIENT)
Age: 36
End: 2021-01-03

## 2021-02-05 ENCOUNTER — TELEPHONE (OUTPATIENT)
Dept: CARDIOLOGY | Facility: CLINIC | Age: 36
End: 2021-02-05

## 2021-02-05 NOTE — TELEPHONE ENCOUNTER
Left message with [facility] receptionist.  Requested a return call to clinic to discuss inquiry further.  Clinic telephone provided.    Located EKG sent to clinic fax on 01/28.  Forwarded to provider to review.    Jesica Andrews LPN

## 2021-02-09 NOTE — TELEPHONE ENCOUNTER
[facility] Call Center    Phone Message    May a detailed message be left on voicemail: yes     Reason for Call: Other: Haydee returning call, regarding medication changes and an EKG. Please give her a call back     Action Taken: Other: cardio    Travel Screening: Not Applicable

## 2021-02-10 NOTE — TELEPHONE ENCOUNTER
[facility] Call Center    Phone Message    May a detailed message be left on voicemail: no     Reason for Call: Other: Haydee Apodaca from [facility] [place] called to discuss recent EKG change and a possible increase in the dose of Duloxetine. Please call Haydee back at [phone].     Action Taken: Message routed to:  [facility] Cardiology    Travel Screening: Not Applicable

## 2021-02-10 NOTE — TELEPHONE ENCOUNTER
Called and left message for Haydee with [facility] receptionist. Noted Dr Sheriff reviewed EKG they sent and that it was WNL.  Ok from cardiovascular perspective to increase Duloxetine per provider.    Jesica Andrews LPN

## 2021-02-19 DIAGNOSIS — I10 BENIGN ESSENTIAL HYPERTENSION: ICD-10-CM

## 2021-02-22 RX ORDER — LISINOPRIL 40 MG/1
40 TABLET ORAL DAILY
Qty: 90 TABLET | Refills: 1 | Status: SHIPPED | OUTPATIENT
Start: 2021-02-22 | End: 2021-08-26

## 2021-02-22 NOTE — TELEPHONE ENCOUNTER
lisinopril 40 mg tablet  Last Written Prescription Date:  8/21/2020  Last Fill Quantity: 90,   # refills: 1  Last Office Visit : 6/18/2020  Future Office visit:  None  90 Tabs, 1 Refill sent to pharm 2/22/2021      Linda Vega RN  Central Triage Red Flags/Med Refills    Warnings Override History for lisinopril (ZESTRIL) 40 MG tablet [[id number]]    Overridden by Vero Fowler RN on Aug 21, 2020 9:59 AM   Allergy/Contraindication   1. RED DYE [Level: Ingredient Match] [Reason: Tolerated medication/side effects in past]

## 2021-03-16 DIAGNOSIS — I10 BENIGN ESSENTIAL HYPERTENSION: ICD-10-CM

## 2021-03-21 RX ORDER — HYDROCHLOROTHIAZIDE 25 MG/1
25 TABLET ORAL DAILY
Qty: 90 TABLET | Refills: 0 | Status: SHIPPED | OUTPATIENT
Start: 2021-03-21 | End: 2021-07-22

## 2021-03-21 NOTE — CONFIDENTIAL NOTE
hydrochlorothiazide (HYDRODIURIL) 25 MG tablet  Last Written Prescription Date:  9/21/20  Last Fill Quantity: 90,   # refills: 1  Last Office Visit : 6/18/20  Future Office visit:  None    90 day SENT to pharm    Routing refill request to provider for review/approval because: zeus, K+, NA past due. Order in epic not drawn

## 2021-04-09 LAB — PHQ9 SCORE: 14

## 2021-04-25 ENCOUNTER — HEALTH MAINTENANCE LETTER (OUTPATIENT)
Age: 36
End: 2021-04-25

## 2021-06-18 ENCOUNTER — MYC REFILL (OUTPATIENT)
Dept: CARDIOLOGY | Facility: CLINIC | Age: 36
End: 2021-06-18

## 2021-06-18 DIAGNOSIS — I10 BENIGN ESSENTIAL HYPERTENSION: Primary | ICD-10-CM

## 2021-06-18 RX ORDER — HYDROCHLOROTHIAZIDE 25 MG/1
25 TABLET ORAL DAILY
Qty: 90 TABLET | Refills: 0 | Status: CANCELLED | OUTPATIENT
Start: 2021-06-18

## 2021-06-21 RX ORDER — HYDROCHLOROTHIAZIDE 25 MG/1
25 TABLET ORAL DAILY
Qty: 30 TABLET | Refills: 0 | Status: SHIPPED | OUTPATIENT
Start: 2021-06-21 | End: 2022-11-17

## 2021-06-21 NOTE — CONFIDENTIAL NOTE
"  hydrochlorothiazide (HYDRODIURIL) 25 MG tablet   Last Written Prescription Date:  3/21/21  Last Fill Quantity: 90   # refills: 0  Last Office Visit : 6/18/20  Future Office visit:  None    \" With Dr. Sheriff in one year with fasting labs prior\"    Scheduling has been notified to contact the pt for appointment.     per my chart request, pt traveling, will be out of meds in a few days. Lab, appt past due. Will send 30 day supply.  "

## 2021-07-29 DIAGNOSIS — I10 BENIGN ESSENTIAL HYPERTENSION: ICD-10-CM

## 2021-07-30 ENCOUNTER — TRANSFERRED RECORDS (OUTPATIENT)
Dept: HEALTH INFORMATION MANAGEMENT | Facility: CLINIC | Age: 36
End: 2021-07-30

## 2021-07-30 LAB — PHQ9 SCORE: 21

## 2021-07-31 RX ORDER — AMLODIPINE BESYLATE 10 MG/1
10 TABLET ORAL DAILY
Qty: 30 TABLET | Refills: 0 | Status: SHIPPED | OUTPATIENT
Start: 2021-07-31 | End: 2021-09-02

## 2021-07-31 NOTE — CONFIDENTIAL NOTE
amLODIPine (NORVASC) 10 MG tablet  Last Written Prescription Date:  8/6/20  Last Fill Quantity: 90,   # refills: 3  Last Office Visit : 6/18/20  Future Office visit:  8/2/21    Bp,labs past due. Order in epic. Not drawn.

## 2021-08-04 ENCOUNTER — TRANSFERRED RECORDS (OUTPATIENT)
Dept: HEALTH INFORMATION MANAGEMENT | Facility: CLINIC | Age: 36
End: 2021-08-04

## 2021-08-04 ENCOUNTER — MEDICAL CORRESPONDENCE (OUTPATIENT)
Dept: HEALTH INFORMATION MANAGEMENT | Facility: CLINIC | Age: 36
End: 2021-08-04

## 2021-08-19 ENCOUNTER — LAB (OUTPATIENT)
Dept: LAB | Facility: CLINIC | Age: 36
End: 2021-08-19
Payer: COMMERCIAL

## 2021-08-19 DIAGNOSIS — Z13.220 LIPID SCREENING: ICD-10-CM

## 2021-08-19 DIAGNOSIS — I10 BENIGN ESSENTIAL HYPERTENSION: ICD-10-CM

## 2021-08-23 DIAGNOSIS — I10 BENIGN ESSENTIAL HYPERTENSION: ICD-10-CM

## 2021-08-26 RX ORDER — LISINOPRIL 40 MG/1
40 TABLET ORAL DAILY
Qty: 90 TABLET | Refills: 1 | OUTPATIENT
Start: 2021-08-26

## 2021-08-30 DIAGNOSIS — I10 BENIGN ESSENTIAL HYPERTENSION: ICD-10-CM

## 2021-09-01 ENCOUNTER — TRANSFERRED RECORDS (OUTPATIENT)
Dept: HEALTH INFORMATION MANAGEMENT | Facility: CLINIC | Age: 36
End: 2021-09-01

## 2021-09-02 RX ORDER — AMLODIPINE BESYLATE 10 MG/1
10 TABLET ORAL DAILY
Qty: 90 TABLET | Refills: 0 | Status: SHIPPED | OUTPATIENT
Start: 2021-09-02 | End: 2021-11-02

## 2021-09-07 ENCOUNTER — TELEPHONE (OUTPATIENT)
Dept: CARDIOLOGY | Facility: CLINIC | Age: 36
End: 2021-09-07

## 2021-09-07 NOTE — TELEPHONE ENCOUNTER
[facility] Call Center    Phone Message    May a detailed message be left on voicemail: yes     Reason for Call: Other: Haydee calling stating she needs to speak with Dr. Sheriff's nurse.  She states it's regarding EKG change. Please call and discuss. Thank you!     Action Taken: Message routed to:  [facility] Cardio    Travel Screening: Not Applicable

## 2021-09-09 NOTE — TELEPHONE ENCOUNTER
Patient is followed at [facility].  He has been started/titrated on duloxetine and they are obtaining EKG's periodically to follow long QT.   I talked to his mental health coordinator, Haydee, today. The QTc on the recent EKG is 490; the EKG in March the QTc was 475. Haydee reports patient was on duloxetine 40 mg but didn't feel it worked for his depression so they are weaning it off now.     In addition Haydee stated they have tried multiple meds for patient's depression and may try another. They wanted to know if Dr. Sheriff felt lexapro or paxil would be contraindicated?  Also, patient is being evaluated for ADHD - would stimulants such as ritalin or aderall be contraindicated?     I sent staff message to Dr. Sheriff and emailed him a copy of the EKG.  Since patient is coming off the duloxetine, I don't think anything needs to be done right now.  I did recommend they repeat the EKG several weeks off duloxetine to get a baseline QT off drug prior to starting others.    Haydee's # [phone]

## 2021-09-27 DIAGNOSIS — I10 BENIGN ESSENTIAL HYPERTENSION: ICD-10-CM

## 2021-09-28 RX ORDER — LISINOPRIL 40 MG/1
40 TABLET ORAL DAILY
Qty: 90 TABLET | Refills: 0 | Status: SHIPPED | OUTPATIENT
Start: 2021-09-28 | End: 2021-11-02

## 2021-09-28 NOTE — TELEPHONE ENCOUNTER
lisinopril 40 mg tablet  Last Written Prescription Date:  8/26/2021  Last Fill Quantity: 30,   # refills: 0  Last Office Visit : 6/18/2020  Future Office visit:  11/1/2021  90 Days sent to pharm 9/28/2021      Linda Vega RN  Central Triage Red Flags/Med Refills    Warnings Override History for lisinopril (ZESTRIL) 40 MG tablet [[id number]]    Overridden by Edita Feng RN on Aug 26, 2021 2:47 PM   Allergy/Contraindication   1. RED DYE [Level: Ingredient Match]

## 2021-10-10 ENCOUNTER — HEALTH MAINTENANCE LETTER (OUTPATIENT)
Age: 36
End: 2021-10-10

## 2021-10-11 ENCOUNTER — TRANSFERRED RECORDS (OUTPATIENT)
Dept: HEALTH INFORMATION MANAGEMENT | Facility: CLINIC | Age: 36
End: 2021-10-11

## 2021-10-13 ENCOUNTER — TELEPHONE (OUTPATIENT)
Dept: CARDIOLOGY | Facility: CLINIC | Age: 36
End: 2021-10-13

## 2021-10-13 NOTE — TELEPHONE ENCOUNTER
[facility] Call Center    Phone Message    May a detailed message be left on voicemail: yes     Reason for Call: Other: Anushka calling stating that she would like to speak with Dr. Sheriff's nurse regarding a stimulant for Madelaine and abnormal EKG results. Please call and discuss. Thank you!     Action Taken: Message routed to:  [facility] Cardio    Travel Screening: Not Applicable

## 2021-10-18 DIAGNOSIS — I10 BENIGN ESSENTIAL HYPERTENSION: ICD-10-CM

## 2021-10-20 ENCOUNTER — MYC REFILL (OUTPATIENT)
Dept: CARDIOLOGY | Facility: CLINIC | Age: 36
End: 2021-10-20

## 2021-10-20 DIAGNOSIS — I10 BENIGN ESSENTIAL HYPERTENSION: ICD-10-CM

## 2021-10-20 RX ORDER — HYDROCHLOROTHIAZIDE 25 MG/1
TABLET ORAL
Qty: 90 TABLET | Refills: 0 | OUTPATIENT
Start: 2021-10-20

## 2021-10-20 RX ORDER — HYDROCHLOROTHIAZIDE 25 MG/1
25 TABLET ORAL DAILY
Qty: 30 TABLET | Refills: 0 | Status: SHIPPED | OUTPATIENT
Start: 2021-10-20 | End: 2021-11-02

## 2021-10-20 NOTE — TELEPHONE ENCOUNTER
hydrochlorothiazide (HYDRODIURIL) 25 MG tablet  Last Written Prescription Date:  7/23/21  Last Fill Quantity: 90,   # refills: 0   Last Office Visit : 6/18/2020  Future Office visit:  11/1/2021  Cr/ K/Na last done 1/6/20. Per Elyssa notes, patient has phobia around blood draws  Routing refill request to provider for review because:Cr/ K/Na last done 1/6/20. Per Elyssa notes, patient has phobia around blood draws  Eileen refill for 30 days per protocol.

## 2021-11-01 ENCOUNTER — OFFICE VISIT (OUTPATIENT)
Dept: CARDIOLOGY | Facility: CLINIC | Age: 36
End: 2021-11-01
Attending: INTERNAL MEDICINE
Payer: COMMERCIAL

## 2021-11-01 ENCOUNTER — CARE COORDINATION (OUTPATIENT)
Dept: CARDIOLOGY | Facility: CLINIC | Age: 36
End: 2021-11-01

## 2021-11-01 VITALS
BODY MASS INDEX: 39.68 KG/M2 | DIASTOLIC BLOOD PRESSURE: 83 MMHG | SYSTOLIC BLOOD PRESSURE: 137 MMHG | HEIGHT: 72 IN | HEART RATE: 89 BPM | WEIGHT: 293 LBS | OXYGEN SATURATION: 98 %

## 2021-11-01 DIAGNOSIS — I45.81 LONG Q-T SYNDROME: Primary | ICD-10-CM

## 2021-11-01 DIAGNOSIS — Z86.79 HISTORY OF DRUG-INDUCED PROLONGED QT INTERVAL WITH TORSADE DE POINTES: Primary | ICD-10-CM

## 2021-11-01 PROCEDURE — 99214 OFFICE O/P EST MOD 30 MIN: CPT | Performed by: INTERNAL MEDICINE

## 2021-11-01 PROCEDURE — 93005 ELECTROCARDIOGRAM TRACING: CPT

## 2021-11-01 PROCEDURE — G0463 HOSPITAL OUTPT CLINIC VISIT: HCPCS | Mod: 25

## 2021-11-01 RX ORDER — ESTRADIOL 2 MG/1
2 TABLET ORAL 3 TIMES DAILY
COMMUNITY

## 2021-11-01 ASSESSMENT — PAIN SCALES - GENERAL: PAINLEVEL: NO PAIN (0)

## 2021-11-01 ASSESSMENT — MIFFLIN-ST. JEOR: SCORE: 3292.7

## 2021-11-01 NOTE — NURSING NOTE
Chief Complaint   Patient presents with     Follow Up     Blood presure managment      Vitals were taken and medications were reconciled.   Deion Mcdonald, EMT  12:33 PM

## 2021-11-01 NOTE — PATIENT INSTRUCTIONS
Patient Instructions:  It was a pleasure to see you in the cardiology clinic today.      If you have any questions, call  Radha Thomas RN, at [phone].  Press Option #1 for the [facility], and then press Option #4  We are encouraging the use of [employer]t to communicate with your HealthCare Provider    Note the new medications: none  Stop the following medications: none    The results from today include: none  Please follow up with Dr. Sheriff in one year with fasting lipid panel       If you have an urgent need after hours (8:00 am to 4:30 pm) please call [phone] and ask for the cardiology fellow on call.

## 2021-11-01 NOTE — LETTER
11/1/2021      RE: Juan Francisco Richardson  [address]  [place] [facility] [address]       Dear Colleague,    Thank you for the opportunity to participate in the care of your patient, Juan Francisco Richardson, at the [facility] [place] at [facility]. Please see a copy of my visit note below.    HPI: Patient Juan Francisco Richardson is a 36 year  patient with a history of headaches, obesity, severe anxiety, depressive disorder, hyperventilation, gender dysphoria and eating disorder who presents  follow-up.   Patient reports feeling better. Patient denies substernal chest pain, shortness of breath, orthopnea, PND  Patient  is compliant with her medications and denies significant side effects.  The reason of the outpatient visit that a new medication which may prolong QT interval and the question if would be safe taking into consideration that there was some previous QT prolongation.          PAST MEDICAL HISTORY:  Past Medical History:   Diagnosis Date     Anxiety      Chronic headache      Depression      Gender dysphoria      Hypertension        CURRENT MEDICATIONS:  Current Outpatient Medications   Medication Sig Dispense Refill     amLODIPine (NORVASC) 10 MG tablet Take 1 tablet (10 mg) by mouth daily 90 tablet 0     estradiol (ESTRACE) 2 MG tablet Take 2 mg by mouth 3 times daily       gabapentin (NEURONTIN) 100 MG capsule Take 100 mg by mouth 3 times daily       hydrochlorothiazide (HYDRODIURIL) 25 MG tablet Take 1 tablet (25 mg) by mouth daily Labs past due. Order in epic. Important. ( Lab scheduling [phone]) 30 tablet 0     ibuprofen (ADVIL/MOTRIN) 800 MG tablet TAKE ONE Tablet BY MOUTH EVERY 8 HOURS AS NEEDED FOR pain.  0     lisinopril (ZESTRIL) 40 MG tablet Take 1 tablet (40 mg) by mouth daily (Please have labs drawn on 11/1/2021 office visit for further refills) Thank you 90 tablet 0     spironolactone (ALDACTONE) 25 MG tablet Take 1 tablet (25 mg) by  mouth daily 90 tablet 3     BUSPIRONE HCL PO Take by mouth 2 times daily (Patient not taking: Reported on 11/1/2021)       estradiol (VIVELLE-DOT) 0.1 MG/24HR bi-weekly patch  (Patient not taking: Reported on 11/1/2021)       hydrochlorothiazide (HYDRODIURIL) 25 MG tablet Take 1 tablet (25 mg) by mouth daily Interim supply while traveling.  Call clinic to schedule follow up appointment.  Labs past due. 30 tablet 0     HYDROcodone-acetaminophen (NORCO) 5-325 MG tablet  (Patient not taking: Reported on 11/1/2021)         PAST SURGICAL HISTORY:  No past surgical history on file.    ALLERGIES     Allergies   Allergen Reactions     Red Dye Itching       FAMILY HISTORY:  No family history on file.    SOCIAL HISTORY:  Social History     Socioeconomic History     Marital status: Single     Spouse name: None     Number of children: None     Years of education: None     Highest education level: None   Occupational History     None   Tobacco Use     Smoking status: Former Smoker     Types: Cigarettes     Smokeless tobacco: Never Used   Substance and Sexual Activity     Alcohol use: Yes     Comment: once a month      Drug use: No     Sexual activity: None   Other Topics Concern     None   Social History Narrative     None     Social Determinants of Health     Financial Resource Strain:      Difficulty of Paying Living Expenses:    Food Insecurity:      Worried About Running Out of Food in the Last Year:      Ran Out of Food in the Last Year:    Transportation Needs:      Lack of Transportation (Medical):      Lack of Transportation (Non-Medical):    Physical Activity:      Days of Exercise per Week:      Minutes of Exercise per Session:    Stress:      Feeling of Stress :    Social Connections:      Frequency of Communication with Friends and Family:      Frequency of Social Gatherings with Friends and Family:      Attends Yarsanism Services:      Active Member of Clubs or Organizations:      Attends Club or Organization  "Meetings:      Marital Status:    Intimate Partner Violence:      Fear of Current or Ex-Partner:      Emotionally Abused:      Physically Abused:      Sexually Abused:        ROS:   Constitutional: No fever, chills, or sweats. No weight gain/loss   ENT: No visual disturbance, ear ache, epistaxis, sore throat  Allergies/Immunologic: Negative.   Respiratory: No cough, hemoptysia  Cardiovascular: As per HPI  GI: No nausea, vomiting, hematemesis, melena, or hematochezia  : No urinary frequency, dysuria, or hematuria  Integument: Negative  Psychiatric: Negative  Neuro: Negative  Endocrinology: Negative   Musculoskeletal: Negative    EXAM:  /83 (BP Location: Left arm, Patient Position: Chair, Cuff Size: Adult Large)   Pulse 89   Ht 2.057 m (6' 9\")   Wt (!) 217.2 kg (478 lb 12.8 oz)   SpO2 98%   BMI 51.31 kg/m    In general, the patient is a pleasant adult in no apparent distress.    HEENT: NC/AT.  PERRLA.  EOMI.  Sclerae white, not injected.  Nares clear.  Pharynx without erythema or exudate.  Dentition intact.    Neck: No adenopathy.  No thyromegaly. Carotids +4/4 bilaterally without bruits.  No jugular venous distension.   Heart: RRR. Normal S1, S2 splits physiologically. No murmur, rub, click, or gallop. The PMI is in the 5th ICS in the midclavicular line. There is no heave.    Lungs: CTA.  No ronchi, wheezes, rales.  No dullness to percussion.   Abdomen: Soft, nontender, nondistended. No organomegaly.  No bruits.   Extremities: No clubbing, cyanosis, or edema.  The pulses are +4/4 at the radial, brachial, femoral, popliteal, DP, and PT sites bilaterally.  No bruits are noted.  Neurologic: Alert and oriented to person/place/time, normal speech, gait and affect  Skin: No petechiae, purpura or rash.    Labs:      BMP RESULTS:  Lab Results   Component Value Date     01/06/2020    POTASSIUM 4.8 01/06/2020    CHLORIDE 100 01/06/2020    CO2 27 01/06/2020    GLC 83 01/06/2020    BUN 20 01/06/2020    CR 1.27 " 01/06/2020    GFRESTIMATED 74 01/06/2020    GFRESTBLACK 86 01/06/2020    RAÚL 10.1 01/06/2020          Procedures:    EKG: normal sin rhythm     Assessment and Plan:     We discussed the results with patient.  We disucssed the importance of a heart healthy diet and lifestyle.  At this moment there are no contra-indication based on the EKG today for the new medication that her physician wants to start.    Quintin Sheriff MD, PhD  Professor of Medicine  Division of Cardiology        CC  Patient Care Team:  Juan Forte MD as MD (Cardiology)  Jesica Andrews LPN as Nurse Coordinator (Cardiology)  Quintin Sheriff MD as Assigned Heart and Vascular Provider  [place] [place]

## 2021-11-01 NOTE — PROGRESS NOTES
HPI: Patient Juan Francisco Richardson is a 36 year  patient with a history of headaches, obesity, severe anxiety, depressive disorder, hyperventilation, gender dysphoria and eating disorder who presents  follow-up.   Patient reports feeling better. Patient denies substernal chest pain, shortness of breath, orthopnea, PND  Patient  is compliant with her medications and denies significant side effects.  The reason of the outpatient visit that a new medication which may prolong QT interval and the question if would be safe taking into consideration that there was some previous QT prolongation.          PAST MEDICAL HISTORY:  Past Medical History:   Diagnosis Date     Anxiety      Chronic headache      Depression      Gender dysphoria      Hypertension        CURRENT MEDICATIONS:  Current Outpatient Medications   Medication Sig Dispense Refill     amLODIPine (NORVASC) 10 MG tablet Take 1 tablet (10 mg) by mouth daily 90 tablet 0     estradiol (ESTRACE) 2 MG tablet Take 2 mg by mouth 3 times daily       gabapentin (NEURONTIN) 100 MG capsule Take 100 mg by mouth 3 times daily       hydrochlorothiazide (HYDRODIURIL) 25 MG tablet Take 1 tablet (25 mg) by mouth daily Labs past due. Order in epic. [employer] ( Lab scheduling [phone]) 30 tablet 0     ibuprofen (ADVIL/MOTRIN) 800 MG tablet TAKE ONE Tablet BY MOUTH EVERY 8 HOURS AS NEEDED FOR pain.  0     lisinopril (ZESTRIL) 40 MG tablet Take 1 tablet (40 mg) by mouth daily (Please have labs drawn on 11/1/2021 office visit for further refills) Thank you 90 tablet 0     spironolactone (ALDACTONE) 25 MG tablet Take 1 tablet (25 mg) by mouth daily 90 tablet 3     BUSPIRONE HCL PO Take by mouth 2 times daily (Patient not taking: Reported on 11/1/2021)       estradiol (VIVELLE-DOT) 0.1 MG/24HR bi-weekly patch  (Patient not taking: Reported on 11/1/2021)       hydrochlorothiazide (HYDRODIURIL) 25 MG tablet Take 1 tablet (25 mg) by mouth daily Interim supply while traveling.  Call  clinic to schedule follow up appointment.  Labs past due. 30 tablet 0     HYDROcodone-acetaminophen (NORCO) 5-325 MG tablet  (Patient not taking: Reported on 11/1/2021)         PAST SURGICAL HISTORY:  No past surgical history on file.    ALLERGIES     Allergies   Allergen Reactions     Red Dye Itching       FAMILY HISTORY:  No family history on file.    SOCIAL HISTORY:  Social History     Socioeconomic History     Marital status: Single     Spouse name: None     Number of children: None     Years of education: None     Highest education level: None   Occupational History     None   Tobacco Use     Smoking status: Former Smoker     Types: Cigarettes     Smokeless tobacco: Never Used   Substance and Sexual Activity     Alcohol use: Yes     Comment: once a month      Drug use: No     Sexual activity: None   Other Topics Concern     None   Social History Narrative     None     Social Determinants of Health     Financial Resource Strain:      Difficulty of Paying Living Expenses:    Food Insecurity:      Worried About Running Out of Food in the Last Year:      Ran Out of Food in the Last Year:    Transportation Needs:      Lack of Transportation (Medical):      Lack of Transportation (Non-Medical):    Physical Activity:      Days of Exercise per Week:      Minutes of Exercise per Session:    Stress:      Feeling of Stress :    Social Connections:      Frequency of Communication with Friends and Family:      Frequency of Social Gatherings with Friends and Family:      Attends Christianity Services:      Active Member of Clubs or Organizations:      Attends Club or Organization Meetings:      Marital Status:    Intimate Partner Violence:      Fear of Current or Ex-Partner:      Emotionally Abused:      Physically Abused:      Sexually Abused:        ROS:   Constitutional: No fever, chills, or sweats. No weight gain/loss   ENT: No visual disturbance, ear ache, epistaxis, sore throat  Allergies/Immunologic: Negative.  "  Respiratory: No cough, hemoptysia  Cardiovascular: As per HPI  GI: No nausea, vomiting, hematemesis, melena, or hematochezia  : No urinary frequency, dysuria, or hematuria  Integument: Negative  Psychiatric: Negative  Neuro: Negative  Endocrinology: Negative   Musculoskeletal: Negative    EXAM:  /83 (BP Location: Left arm, Patient Position: Chair, Cuff Size: Adult Large)   Pulse 89   Ht 2.057 m (6' 9\")   Wt (!) 217.2 kg (478 lb 12.8 oz)   SpO2 98%   BMI 51.31 kg/m    In general, the patient is a pleasant adult in no apparent distress.    HEENT: NC/AT.  PERRLA.  EOMI.  Sclerae white, not injected.  Nares clear.  Pharynx without erythema or exudate.  Dentition intact.    Neck: No adenopathy.  No thyromegaly. Carotids +4/4 bilaterally without bruits.  No jugular venous distension.   Heart: RRR. Normal S1, S2 splits physiologically. No murmur, rub, click, or gallop. The PMI is in the 5th ICS in the midclavicular line. There is no heave.    Lungs: CTA.  No ronchi, wheezes, rales.  No dullness to percussion.   Abdomen: Soft, nontender, nondistended. No organomegaly.  No bruits.   Extremities: No clubbing, cyanosis, or edema.  The pulses are +4/4 at the radial, brachial, femoral, popliteal, DP, and PT sites bilaterally.  No bruits are noted.  Neurologic: Alert and oriented to person/place/time, normal speech, gait and affect  Skin: No petechiae, purpura or rash.    Labs:      BMP RESULTS:  Lab Results   Component Value Date     01/06/2020    POTASSIUM 4.8 01/06/2020    CHLORIDE 100 01/06/2020    CO2 27 01/06/2020    GLC 83 01/06/2020    BUN 20 01/06/2020    CR 1.27 01/06/2020    GFRESTIMATED 74 01/06/2020    GFRESTBLACK 86 01/06/2020    RAÚL 10.1 01/06/2020          Procedures:    EKG: normal sin rhythm     Assessment and Plan:     We discussed the results with patient.  We disucssed the importance of a heart healthy diet and lifestyle.  At this moment there are no contra-indication based on the EKG " today for the new medication that her physician wants to start.    Quintin Sheriff MD, PhD  Professor of Medicine  Division of Cardiology        CC  Patient Care Team:  Juan Forte MD as MD (Cardiology)  Jesica Andrews LPN as Nurse Coordinator (Cardiology)  Quintin Sheriff MD as Assigned Heart and Vascular Provider  MERCEDES MILSL

## 2021-11-01 NOTE — PROGRESS NOTES
Received call from KIRK Hubbard from [facility] ([phone])  last week who is caring for Madelaine. Madelaine was being treated for depression and was on duloxetine at one point. Patient has had previous EKG's which showed long QTc at baseline is 475 and on duloxetine the QTc was 490. The duloxetine as well as other meds were stopped as it was not effective in treating the patient's depression. Patient elecetd to not treat the depression any further as it was not helping.     However, patient has also has been diagnosed with ADD and NP reported that patient wishes to be treated for it.  Previously, Dr. Sheriff had felt that the meds (Adderal, Ritalin) may cause palpitations and disrupted sleep and therefore, not used.    NP is requesting Cardiology opinion on treating patient's ADDwith Stratera?  Also, please obtain EKG to re-check QTc as it was still long about a week (485) after discontinuing the duloxetine.

## 2021-11-02 LAB
ATRIAL RATE - MUSE: 77 BPM
DIASTOLIC BLOOD PRESSURE - MUSE: NORMAL MMHG
INTERPRETATION ECG - MUSE: NORMAL
P AXIS - MUSE: 39 DEGREES
PR INTERVAL - MUSE: 160 MS
QRS DURATION - MUSE: 94 MS
QT - MUSE: 404 MS
QTC - MUSE: 457 MS
R AXIS - MUSE: 10 DEGREES
SYSTOLIC BLOOD PRESSURE - MUSE: NORMAL MMHG
T AXIS - MUSE: 11 DEGREES
VENTRICULAR RATE- MUSE: 77 BPM

## 2021-11-02 RX ORDER — AMLODIPINE BESYLATE 10 MG/1
10 TABLET ORAL DAILY
Qty: 90 TABLET | Refills: 3 | Status: SHIPPED | OUTPATIENT
Start: 2021-11-02 | End: 2022-10-27

## 2021-11-02 RX ORDER — HYDROCHLOROTHIAZIDE 25 MG/1
25 TABLET ORAL DAILY
Qty: 90 TABLET | Refills: 3 | Status: SHIPPED | OUTPATIENT
Start: 2021-11-02 | End: 2022-11-17

## 2021-11-02 RX ORDER — LISINOPRIL 40 MG/1
40 TABLET ORAL DAILY
Qty: 90 TABLET | Refills: 3 | Status: SHIPPED | OUTPATIENT
Start: 2021-11-02 | End: 2022-11-17

## 2021-11-03 NOTE — NURSING NOTE
S-(situation): cardiology office visit November 1st, 2021. KIRK Hubbard from [facility] ([phone])  last week who is caring for Madelaine. Madelaine was being treated for depression and was on duloxetine at one point. Patient has had previous EKG's which showed long QTc at baseline is 475 and on duloxetine the QTc was 490. The duloxetine as well as other meds were stopped as it was not effective in treating the patient's depression. Patient elecetd to not treat the depression any further as it was not helping.  EKG recheck show sinus rhythm with sinus arythmia with a QTc of 457.  Patient has also has been diagnosed with ADD and NP reported that patient wishes to be treated for it.    B-(background): Patient Juan Francisco Richardson is a 36 year patient with a history of headaches, obesity, severe anxiety, depressive disorder, hyperventilation, gender dysphoria and eating disorder.    Patient denies substernal chest pain, shortness of breath, orthopnea, PND.  Patient reports mild leg swelling that is stable.  Patient  is compliant with her medications and denies significant side effects.  NP from behavior health is asking if patient can be treated with Stratera for ADD.    A-(assessment): QTc normalized     R-(recommendations): Dr. Sheriff endorses the use of Stratera for ADD.

## 2022-01-11 ENCOUNTER — TELEPHONE (OUTPATIENT)
Dept: CARDIOLOGY | Facility: CLINIC | Age: 37
End: 2022-01-11
Payer: COMMERCIAL

## 2022-01-11 NOTE — TELEPHONE ENCOUNTER
[facility] Call Center    Phone Message    May a detailed message be left on voicemail: yes     Reason for Call: Other: KIRK Reveles would like a call back to discuss putting pt on clonadine for adhd and would like to discuss with Dr. Sheriff     Action Taken: Message routed to:  [facility] Cardio    Travel Screening: Not Applicable

## 2022-01-13 NOTE — TELEPHONE ENCOUNTER
"Called Anushka back and relayed Dr. Sheriff' message:    \"Personally I am not in favor for clondine - risk for BP peaks if patient would stop it and a lot of side-effects  dry mouth, sleepiness etc.     I nearly never use clonidine.\"          "

## 2022-05-21 ENCOUNTER — HEALTH MAINTENANCE LETTER (OUTPATIENT)
Age: 37
End: 2022-05-21

## 2022-05-28 ENCOUNTER — TELEPHONE (OUTPATIENT)
Dept: CARDIOLOGY | Facility: CLINIC | Age: 37
End: 2022-05-28
Payer: COMMERCIAL

## 2022-06-07 ENCOUNTER — MEDICAL CORRESPONDENCE (OUTPATIENT)
Dept: HEALTH INFORMATION MANAGEMENT | Facility: CLINIC | Age: 37
End: 2022-06-07

## 2022-06-15 ENCOUNTER — TRANSFERRED RECORDS (OUTPATIENT)
Dept: HEALTH INFORMATION MANAGEMENT | Facility: CLINIC | Age: 37
End: 2022-06-15
Payer: COMMERCIAL

## 2022-06-16 ENCOUNTER — MEDICAL CORRESPONDENCE (OUTPATIENT)
Dept: HEALTH INFORMATION MANAGEMENT | Facility: CLINIC | Age: 37
End: 2022-06-16
Payer: COMMERCIAL

## 2022-06-20 ENCOUNTER — TRANSCRIBE ORDERS (OUTPATIENT)
Dept: OTHER | Age: 37
End: 2022-06-20
Payer: COMMERCIAL

## 2022-06-20 DIAGNOSIS — F64.9 GENDER DYSPHORIA: Primary | ICD-10-CM

## 2022-06-22 ENCOUNTER — TELEPHONE (OUTPATIENT)
Dept: DERMATOLOGY | Facility: CLINIC | Age: 37
End: 2022-06-22

## 2022-06-22 NOTE — TELEPHONE ENCOUNTER
[facility] Call Center    Phone Message    May a detailed message be left on voicemail: yes     Reason for Call: Other: Pt has a referral for [facility] fo [facility] care and would like to be added to the wait list for the [facility]. Thank you.      Action Taken: Message routed to:  [facility] Derm    Travel Screening: Not Applicable

## 2022-07-08 NOTE — TELEPHONE ENCOUNTER
[facility] Call Center    Phone Message    May a detailed message be left on voicemail: yes     Reason for Call: Other: Pt states she is returning a missed call from 7/1/22. Writer does not see any record of a call made in Epic.      Pt is on the waiting list for [facility].     Please call Pt back to discuss. Thank you.     Action Taken: Message routed to:  [facility] Derm    Travel Screening: Not Applicable

## 2022-07-14 ENCOUNTER — TELEPHONE (OUTPATIENT)
Dept: PSYCHOLOGY | Facility: CLINIC | Age: 37
End: 2022-07-14

## 2022-07-14 NOTE — TELEPHONE ENCOUNTER
Date: 2022    Client Name:  Juan Francisco Richardson  Preferred Name: Madelaine Jeffers  MRN: [id number]   : 1985  Age:  37 year old    Presenting Problem / Reason for Assessment:     LOS    Suggested Program:  TG    Interested in Couples/Family Therapy?  No    Any legal charges pending?:   No    Patient wishes to be contacted regarding Insurance benefits?:  No    Insurance Benefits to be evaluated.  Note will be entered when validated.    Please Verify Registration    Please send Welcome Packet and document date sent.

## 2022-08-03 ENCOUNTER — MEDICAL CORRESPONDENCE (OUTPATIENT)
Dept: HEALTH INFORMATION MANAGEMENT | Facility: CLINIC | Age: 37
End: 2022-08-03

## 2022-09-18 ENCOUNTER — HEALTH MAINTENANCE LETTER (OUTPATIENT)
Age: 37
End: 2022-09-18

## 2022-10-05 ASSESSMENT — PATIENT HEALTH QUESTIONNAIRE - PHQ9
10. IF YOU CHECKED OFF ANY PROBLEMS, HOW DIFFICULT HAVE THESE PROBLEMS MADE IT FOR YOU TO DO YOUR WORK, TAKE CARE OF THINGS AT HOME, OR GET ALONG WITH OTHER PEOPLE: EXTREMELY DIFFICULT
SUM OF ALL RESPONSES TO PHQ QUESTIONS 1-9: 16
SUM OF ALL RESPONSES TO PHQ QUESTIONS 1-9: 16

## 2022-10-06 ENCOUNTER — VIRTUAL VISIT (OUTPATIENT)
Dept: PSYCHOLOGY | Facility: CLINIC | Age: 37
End: 2022-10-06
Payer: COMMERCIAL

## 2022-10-06 DIAGNOSIS — F41.1 GENERALIZED ANXIETY DISORDER: ICD-10-CM

## 2022-10-06 DIAGNOSIS — F64.9 GENDER DYSPHORIA: Primary | ICD-10-CM

## 2022-10-06 DIAGNOSIS — F34.1 DYSTHYMIA: ICD-10-CM

## 2022-10-06 PROCEDURE — 90791 PSYCH DIAGNOSTIC EVALUATION: CPT | Mod: 95 | Performed by: MARRIAGE & FAMILY THERAPIST
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
## 2022-10-19 ENCOUNTER — VIRTUAL VISIT (OUTPATIENT)
Dept: PSYCHOLOGY | Facility: CLINIC | Age: 37
End: 2022-10-19
Payer: COMMERCIAL

## 2022-10-19 DIAGNOSIS — F64.9 GENDER DYSPHORIA: Primary | ICD-10-CM

## 2022-10-19 DIAGNOSIS — F34.1 DYSTHYMIA: ICD-10-CM

## 2022-10-19 DIAGNOSIS — F41.1 GENERALIZED ANXIETY DISORDER: ICD-10-CM

## 2022-10-19 PROCEDURE — 90837 PSYTX W PT 60 MINUTES: CPT | Mod: 95 | Performed by: MARRIAGE & FAMILY THERAPIST

## 2022-10-19 NOTE — PROGRESS NOTES
[facility] - Progress Note    Date of Service: 10/19/22   Name: Madelaine Jeffers  : 1985  Medical Record Number: [id number]  Treating Provider: Elise Sandra PsyD  Type of Session: Individual  Present in Session: Client  Session Start and Stop Time: 9:05-10:00  Number of Minutes:  55    SERVICE MODALITY:  Video Visit:      Provider verified identity through the following two step process.  Patient provided:  Patient was verified at admission/transfer    Telemedicine Visit: The patient's condition can be safely assessed and treated via synchronous audio and visual telemedicine encounter.      Reason for Telemedicine Visit: Patient convenience (e.g. access to timely appointments / distance to available provider)    Originating Site (Patient Location): Patient's home    Distant Site (Provider Location): [facility]    Consent:  The patient/guardian has verbally consented to: the potential risks and benefits of telemedicine (video visit) versus in person care; bill my insurance or make self-payment for services provided; and responsibility for payment of non-covered services.     Patient would like the video invitation sent by:  My Chart    Mode of Communication:  [facility] Conference [facility]    Distant Location (Provider):  On-site    As the provider I attest to compliance with applicable laws and regulations related to telemedicine.    DSM-5 Diagnoses:  Encounter Diagnoses   Name Primary?     Gender dysphoria Yes     Generalized anxiety disorder      Dysthymia        Current Reported Symptoms and Status update:  Ongoing gender dysphoria related to birth assigned sex, social and generalized anxiety, and low mood.    Progress Toward Treatment Goals:   New Objective established this session     Therapeutic Interventions/Treatment Strategies:    Area(s) of treatment focus addressed in this session included Symptom Management, Gender Health and Physical  Health     Psychotherapist offered support, feedback and validation and set limits Treatment modalities used include Solution Focused Therapy Gender Affirming Care Interpersonal Symptoms Management: Promoted understanding of their diagnoses and how it impacts their functioning and facilitated discussion about medical interventions and explored challenging unrealistic expectations of self/others  Support and Structured Activity    Patient Response:   Patient responded to session by accepting feedback, listening and focusing on goals  Possible barriers to participation / learning include: N/A    Current Mental Status Exam:   Appearance:  Appropriate   Eye Contact:  Good   Attitude / Demeanor: Cooperative   Speech      Rate / Production: Normal/ Responsive      Volume:  Normal  volume  Orientation:  All  Mood:   Anxious   Affect:   Subdued   Thought Content: Clear   Insight:   Good       Plan/Need for Future Services:  Return for therapy in 2 weeks to treat diagnosed problems.    Patient has an initial individualized treatment plan that was created as part of their diagnostic assessment / admission process.  A master individualized treatment plan is in the process of being developed with the patient and multi-disciplinary care team.    Referral / Collaboration:  The following referral(s) will be initiated: [facility].    Emergency Intervention needed?  No    Assignment:  none    Interactive Complexity:  There are four specific communication difficulties that complicate the work of the primary psychiatric procedure.  Interactive complexity (+[id number]) may be reported when at least one of these difficulties is present.    Communication difficulties present during current the psychiatric procedure include:  1. None.      Signature/Title:    Elise Sandra PsyD       TREATMENT PLAN  Date of Treatment Plan 10/19/22  Name: Madelaine Jeffers  : 1985  Medical Record Number: [id number]  Treating Provider: Elise Sandra PsyD,  LMFT    Current Status:  Depression/Mood:  Sad  Decreased Interest  Aggitation  Decreased Self-Esteem  Hopelessness/Helplessness  Irritable  Decreased Concentration  Dysphoria    Anxiety/Panic:  Reports no problems or symptoms at this time    Thought:   Distractable    Sensorium:  Reports no problems or symptoms at this time    Behavior/Health:  Reports no problems or symptoms at this time  Avoidant  Pain    Chemical Use:  Denies both Alcohol and Drug Abuse    Sexual Problems:  Low Desire  Gender Problems    Suicide Risk Assessment:  Assessed Level of Immediate Risk:  YES  Ideation:  NO  Plan:  NO  Means:  NO  Intent:  NO    Homicide Risk Assessment:  Assessed Level of Immediate Risk:  YES  Ideation:  No  Plan:  NO  Means:  NO  Intent:  NO    Impact of Symptoms on Function:  Decreased Physical/Health Status  Decreased Social/Family Function    DSM-5 Diagnoses:       Screening Questionnaires:  Please indicate whether the following screening questionnaires have been completed:  CAGE: Yes  PHQ-9: Yes    SIMON-7: Yes  WHODAS: No    Problem(s):  1. Gender dysphoria  2. Anxiety    Short-Long Term Goals  Gender related goals: facial hair removal, voice therapy, FFS, top surgery, bottom surgery (interested but ambivalent at this point).     Primary therapy: task completion, changing thought patterns, general anxiety management. Client reported brain fog post-COVID. ADHD very difficult, hard to treat due to cardiac issues.       Interventions  [facility]  Cognitive-Behavioral Therapy  Behavior Therapy    Expected Outcomes and Prognosis  Expected improvement    Anticipated Treatment Duration:  Unknown    Frequency of Sessions  2 x / Month    Progress Update (for Plan Update Only)  NA:  1st Treatment Planning    Current Psychoactive Medications:  Not Applicable  Discharge & Aftercare Goals: To Be Determined.  Care Coordination: Yes    Consent to Treatment:   Patient participated in this treatment planning process and  indicated verbal agreement with the above treatment plan.  If patient doesn't sign, indicate why: Telehealth visit due to COVID19 pandemic    Patient Signature: N/A Telehealth due to COVID 19 pandemic  Date: 10/19/22      10/19/2022

## 2022-10-24 DIAGNOSIS — I10 BENIGN ESSENTIAL HYPERTENSION: ICD-10-CM

## 2022-10-27 RX ORDER — AMLODIPINE BESYLATE 10 MG/1
10 TABLET ORAL DAILY
Qty: 90 TABLET | Refills: 0 | Status: SHIPPED | OUTPATIENT
Start: 2022-10-27 | End: 2023-01-31

## 2022-11-15 NOTE — TELEPHONE ENCOUNTER
"10/18 - Called [facility] and spoke with Eunice as Haydee was not available today. Per Dr. Sheriff, \"they can try lexapro or paxil but ritalin or aderall will give him palpitations in function of the dosage and may cause sleep disturbances.\"  Eunice will relay this information to Haydee and will call me back if she has further questions.       " Care Management Interventions  PCP Verified by CM: Yes Rom Todd MD )  Last Visit to PCP: 11/02/22  Palliative Care Criteria Met (RRAT>21 & CHF Dx)?: No  Mode of Transport at Discharge: Other (see comment) (POV)  Transition of Care Consult (CM Consult): Discharge Planning  Physical Therapy Consult: No  Occupational Therapy Consult: No  Speech Therapy Consult: No  Support Systems: Child(alex)  Confirm Follow Up Transport: Family  The Plan for Transition of Care is Related to the Following Treatment Goals : Treat hyponatremia  Discharge Location  Patient Expects to be Discharged to[de-identified] Home    Patient is  being discharged home today. He REFUSED home health. Told patient to let CM know if he changes his mind. He has our contact information. Patient is aware and agrees with dc plan. Patient is aware and agrees with dc plan. Told patient to contact CM for any questions or concerns post discharge. RUR: 14% LOW     Transition of Care (GREER) Plan:  Home     GREER Transportation:       How is patient being transported at discharge? POV       When? Today      Is transport scheduled? N/a     Follow-up appointment and transportation:     PCP? Stefan Sibley MD      Who is transporting to the follow-up appointment? POV      Is transport for follow up appointment scheduled? N/a    Communication plan (with patient/family): Patient is aware and agrees with dc plan. Who is being called? Patient or Next of Kin? Responsible party? Patient       What number(s) is to be used? [phone]      What service provider is calling for [facility] services? [facility]       When are they calling?  24--48 hours prior to noa       Click here to complete [address] including selection of the Healthcare Decision Maker Relationship (ie \"Primary\")  @[email or web address]

## 2022-11-17 DIAGNOSIS — I10 BENIGN ESSENTIAL HYPERTENSION: ICD-10-CM

## 2022-11-17 RX ORDER — LISINOPRIL 40 MG/1
40 TABLET ORAL DAILY
Qty: 30 TABLET | Refills: 0 | Status: SHIPPED | OUTPATIENT
Start: 2022-11-17 | End: 2022-12-29

## 2022-11-17 RX ORDER — HYDROCHLOROTHIAZIDE 25 MG/1
25 TABLET ORAL DAILY
Qty: 30 TABLET | Refills: 0 | Status: SHIPPED | OUTPATIENT
Start: 2022-11-17 | End: 2022-12-29

## 2022-12-06 ENCOUNTER — TELEPHONE (OUTPATIENT)
Dept: CARDIOLOGY | Facility: CLINIC | Age: 37
End: 2022-12-06

## 2022-12-06 DIAGNOSIS — Z00.00 PREVENTATIVE HEALTH CARE: ICD-10-CM

## 2022-12-06 DIAGNOSIS — I10 BENIGN ESSENTIAL HYPERTENSION: Primary | ICD-10-CM

## 2022-12-07 NOTE — TELEPHONE ENCOUNTER
Please help schedule fasting labs prior to 12/12 [facility] visit.

## 2022-12-19 ENCOUNTER — TELEPHONE (OUTPATIENT)
Dept: PLASTIC SURGERY | Facility: CLINIC | Age: 37
End: 2022-12-19

## 2022-12-19 NOTE — CONFIDENTIAL NOTE
Writer DESIREE re: follow up on provider referral for [facility] services. Pt read writer's [employer] message but didn't respond. Writer ARMÓNM to make sure pt got everything they needed. Instructed pt to call back if they would like to further discuss referral.

## 2022-12-29 ENCOUNTER — MYC REFILL (OUTPATIENT)
Dept: CARDIOLOGY | Facility: CLINIC | Age: 37
End: 2022-12-29

## 2022-12-29 ENCOUNTER — LAB (OUTPATIENT)
Dept: LAB | Facility: CLINIC | Age: 37
End: 2022-12-29
Payer: COMMERCIAL

## 2022-12-29 DIAGNOSIS — I10 BENIGN ESSENTIAL HYPERTENSION: ICD-10-CM

## 2022-12-29 DIAGNOSIS — Z00.00 PREVENTATIVE HEALTH CARE: ICD-10-CM

## 2022-12-29 LAB
ALBUMIN SERPL BCG-MCNC: 4 G/DL (ref 3.5–5.2)
ALP SERPL-CCNC: 55 U/L (ref 35–129)
ALT SERPL W P-5'-P-CCNC: 17 U/L (ref 10–50)
ANION GAP SERPL CALCULATED.3IONS-SCNC: 8 MMOL/L (ref 7–15)
AST SERPL W P-5'-P-CCNC: 23 U/L (ref 10–50)
BILIRUB SERPL-MCNC: 0.3 MG/DL
BUN SERPL-MCNC: 12.5 MG/DL (ref 6–20)
CALCIUM SERPL-MCNC: 9.2 MG/DL (ref 8.6–10)
CHLORIDE SERPL-SCNC: 106 MMOL/L (ref 98–107)
CHOLEST SERPL-MCNC: 160 MG/DL
CREAT SERPL-MCNC: 0.99 MG/DL (ref 0.51–1.17)
DEPRECATED HCO3 PLAS-SCNC: 26 MMOL/L (ref 22–29)
GFR SERPL CREATININE-BSD FRML MDRD: 76 ML/MIN/1.73M2
GLUCOSE SERPL-MCNC: 102 MG/DL (ref 70–99)
HDLC SERPL-MCNC: 38 MG/DL
LDLC SERPL CALC-MCNC: 98 MG/DL
LDLC SERPL DIRECT ASSAY-MCNC: 102 MG/DL
NONHDLC SERPL-MCNC: 122 MG/DL
POTASSIUM SERPL-SCNC: 4.3 MMOL/L (ref 3.4–5.3)
PROT SERPL-MCNC: 6.8 G/DL (ref 6.4–8.3)
SODIUM SERPL-SCNC: 140 MMOL/L (ref 136–145)
TRIGL SERPL-MCNC: 120 MG/DL

## 2022-12-29 PROCEDURE — 80053 COMPREHEN METABOLIC PANEL: CPT | Performed by: PATHOLOGY

## 2022-12-29 PROCEDURE — 99000 SPECIMEN HANDLING OFFICE-LAB: CPT | Performed by: PATHOLOGY

## 2022-12-29 PROCEDURE — 36415 COLL VENOUS BLD VENIPUNCTURE: CPT | Performed by: PATHOLOGY

## 2022-12-29 PROCEDURE — 83721 ASSAY OF BLOOD LIPOPROTEIN: CPT | Mod: XU | Performed by: PATHOLOGY

## 2022-12-29 PROCEDURE — 80061 LIPID PANEL: CPT | Performed by: PATHOLOGY

## 2022-12-29 RX ORDER — LISINOPRIL 40 MG/1
40 TABLET ORAL DAILY
Qty: 90 TABLET | Refills: 1 | Status: SHIPPED | OUTPATIENT
Start: 2022-12-29 | End: 2023-04-06

## 2022-12-29 RX ORDER — HYDROCHLOROTHIAZIDE 25 MG/1
25 TABLET ORAL DAILY
Qty: 90 TABLET | Refills: 1 | Status: SHIPPED | OUTPATIENT
Start: 2022-12-29 | End: 2023-04-06

## 2022-12-29 NOTE — TELEPHONE ENCOUNTER
hydrochlorothiazide (HYDRODIURIL) 25 MG tablet   Last Written Prescription Date:  11/17/22  Last Fill Quantity: 30,   # refills: 0        lisinopril (ZESTRIL) 40 MG tablet    Last Written Prescription Date:  11/17/22  Last Fill Quantity: 30,   # refills: 0  Last Office Visit : 11/1/21  Future Office visit:  4/6/23       BP and appt due> scheduled for 4/6/23  Labs done today 12/29/22

## 2023-01-30 DIAGNOSIS — I10 BENIGN ESSENTIAL HYPERTENSION: ICD-10-CM

## 2023-01-31 ENCOUNTER — MYC REFILL (OUTPATIENT)
Dept: CARDIOLOGY | Facility: CLINIC | Age: 38
End: 2023-01-31
Payer: COMMERCIAL

## 2023-01-31 DIAGNOSIS — I10 BENIGN ESSENTIAL HYPERTENSION: ICD-10-CM

## 2023-02-01 RX ORDER — AMLODIPINE BESYLATE 10 MG/1
10 TABLET ORAL DAILY
Qty: 90 TABLET | Refills: 0 | Status: SHIPPED | OUTPATIENT
Start: 2023-02-01 | End: 2023-04-06

## 2023-02-01 RX ORDER — AMLODIPINE BESYLATE 10 MG/1
10 TABLET ORAL DAILY
Qty: 90 TABLET | Refills: 0 | OUTPATIENT
Start: 2023-02-01

## 2023-02-01 NOTE — TELEPHONE ENCOUNTER
amLODIPine (NORVASC) 10 MG tablet    Last Written Prescription Date:  10/27/22  Last Fill Quantity: 90,   # refills: 0   Last Office Visit : 11/1/21  Future Office visit:  4/6/23  Labs done 12/29/22  BP and appt due and scheduled for 4/6/23> carloz refill sent

## 2023-02-03 NOTE — TELEPHONE ENCOUNTER
Patient Education     Kegel Exercises  Kegel exercises are done to help strengthen the muscles in your pelvic floor. They’re easy to learn and simple to do. And if you do them right, no one can tell you’re doing them. You can do them almost anywhere. Your healthcare provider, nurse, or physical therapist can answer any questions you have and help you get started.   A weak pelvic floor  The pelvic floor muscles may weaken. This can happen due to any of these:   Aging  Pregnancy  Vaginal childbirth  Injury  Surgery  Chronic cough  Lack of exercise  If the pelvic floor is weak, your bladder and other pelvic organs may sag out of place. The urethra may open too easily. This can allow urine to leak out. Kegel exercises can help you strengthen your pelvic floor muscles. They can better support your pelvic organs and control your urine flow.   How to do Kegel exercises  Try each of the Kegel exercises below. When you’re doing them, try not to move your leg, buttock, or stomach muscles:   Squeeze as if you are stopping your urine stream. But do it when you’re not urinating.  Tighten your rectum as if trying not to pass gas. Squeeze your anus, but don’t move your buttocks.  Tip: Place 1 or 2 fingers in the vagina. Squeeze your finger with your vagina. This will help you to learn which muscles to tighten.   Try to hold each Kegel for a slow count to 5. You probably won’t be able to hold it for that long at first. But keep practicing. It will get easier as your pelvic floor gets stronger. At some point, your healthcare provider may advise you to use special weights. You place these in your vagina before you do the Kegels. This can help make the Kegels even more effective. Talk to your healthcare provider if you have trouble doing Kegel exercises.   Helpful tips  Here are some tips to follow:  Do Kegels as often as you can. The more you do them, the faster you’ll feel the results.  Pick an activity you do often as a reminder.  hydrochlorothiazide (HYDRODIURIL) 25 MG tablet   hydrochlorothiazide (HYDRODIURIL) 25 MG tablet  90 tablet  1  9/21/2020   No    Sig - Route: Take 1 tablet (25 mg) by mouth daily - Oral    Sent to pharmacy as: hydroCHLOROthiazide 25 MG Oral Tablet (HYDRODIURIL)    Class: E-Prescribe    Order: [id number]    E-Prescribing Status: Receipt confirmed by pharmacy (9/21/2020  9:20 AM CDT)    Printout Tracking     External Result Report    Pharmacy     [facility] - [place], [place] - [address] [place], [place] [id number]         For instance, do your Kegels every time you sit down.  Tighten your pelvic floor before you sneeze, get up from a chair, cough, laugh, or lift. This can help prevent urine, gas, or stool leakage.  Moi last reviewed this educational content on 8/1/2020  © 2325-8456 [employer]. All rights reserved. This information is not intended as a substitute for professional medical care. Always follow your healthcare professional's instructions.

## 2023-03-29 ENCOUNTER — TELEPHONE (OUTPATIENT)
Dept: CARDIOLOGY | Facility: CLINIC | Age: 38
End: 2023-03-29
Payer: COMMERCIAL

## 2023-03-29 NOTE — TELEPHONE ENCOUNTER
03/29 M For patient to call back and switch 430 appt to the held time slot at 1:00pm at the request of Dr. Sheriff's team

## 2023-04-03 PROBLEM — F64.0 TRANSSEXUALISM: Status: ACTIVE | Noted: 2019-12-19

## 2023-04-05 NOTE — PROGRESS NOTES
HPI:   Patient Juan Francisco Richardson is a 38 year old patient with a history of headaches, obesity, severe anxiety, depressive disorder, hyperventilation, gender dysphoria and eating disorder who presents  follow-up.  Last visit 11/1/2021. Reason  for the visit at that time was to monitor patient's QTc prior to starting duloxetine.   QTc was 457 at that time.  It appears patient is no longer on duloxetine.  QTc is 470 today.    Today, patient denies chest pain, shortness of breath, orthopnea, PND or dizziness.       PAST MEDICAL HISTORY:  Past Medical History:   Diagnosis Date     Anxiety      Chronic headache      Depression      Gender dysphoria      Hypertension        CURRENT MEDICATIONS:  Current Outpatient Medications   Medication Sig Dispense Refill     amLODIPine (NORVASC) 10 MG tablet Take 1 tablet (10 mg) by mouth daily 90 tablet 0     estradiol (ESTRACE) 2 MG tablet Take 2 mg by mouth 3 times daily       hydrochlorothiazide (HYDRODIURIL) 25 MG tablet Take 1 tablet (25 mg) by mouth daily Appointment 4/6/23 Labs done 12/29/22 90 tablet 1     ibuprofen (ADVIL/MOTRIN) 800 MG tablet TAKE ONE Tablet BY MOUTH EVERY 8 HOURS AS NEEDED FOR pain.  0     lisinopril (ZESTRIL) 40 MG tablet Take 1 tablet (40 mg) by mouth daily Appointment 4/6/23 Labs done 12/29/22 90 tablet 1     spironolactone (ALDACTONE) 25 MG tablet Take 1 tablet (25 mg) by mouth daily 90 tablet 3     gabapentin (NEURONTIN) 100 MG capsule Take 100 mg by mouth 3 times daily       methylphenidate (RITALIN) 5 MG tablet TAKE ONE Tablet (FIVE MG) BY MOUTH DAILY       progesterone (PROMETRIUM) 100 MG capsule TAKE ONE CAPSULE (100MG) BY MOUTH EVERY DAY         PAST SURGICAL HISTORY:  No past surgical history on file.    ALLERGIES     Allergies   Allergen Reactions     Red Dye Itching       FAMILY HISTORY:  No family history on file.    SOCIAL HISTORY:  Social History     Socioeconomic History     Marital status: Single     Spouse name: None     Number of  "children: None     Years of education: None     Highest education level: None   Occupational History     None   Tobacco Use     Smoking status: Former Smoker     Types: Cigarettes     Smokeless tobacco: Never Used   Substance and Sexual Activity     Alcohol use: Yes     Comment: once a month      Drug use: No     Sexual activity: None   Other Topics Concern     None   Social History Narrative     None     Social Determinants of Health     Financial Resource Strain:      Difficulty of Paying Living Expenses:    Food Insecurity:      Worried About Running Out of Food in the Last Year:      Ran Out of Food in the Last Year:    Transportation Needs:      Lack of Transportation (Medical):      Lack of Transportation (Non-Medical):    Physical Activity:      Days of Exercise per Week:      Minutes of Exercise per Session:    Stress:      Feeling of Stress :    Social Connections:      Frequency of Communication with Friends and Family:      Frequency of Social Gatherings with Friends and Family:      Attends Buddhism Services:      Active Member of Clubs or Organizations:      Attends Club or Organization Meetings:      Marital Status:    Intimate Partner Violence:      Fear of Current or Ex-Partner:      Emotionally Abused:      Physically Abused:      Sexually Abused:        ROS:   Constitutional: No fever, chills, or sweats. No weight gain/loss   ENT: No visual disturbance, ear ache, epistaxis, sore throat  Allergies/Immunologic: Negative.   Respiratory: No cough, hemoptysia  Cardiovascular: As per HPI  GI: No nausea, vomiting, hematemesis, melena, or hematochezia  : No urinary frequency, dysuria, or hematuria  Integument: Negative  Psychiatric: Negative  Neuro: Negative  Endocrinology: Negative   Musculoskeletal: Negative    EXAM:  /84 (BP Location: Right arm, Patient Position: Chair, Cuff Size: Adult Large)   Ht 2.018 m (6' 7.45\")   Wt (!) 212.5 kg (468 lb 8 oz)   BMI 52.18 kg/m    In general, the patient " is a pleasant adult in no apparent distress.    HEENT: NC/AT.  PERRLA.  EOMI.  Sclerae white, not injected.  Nares clear.  Pharynx without erythema or exudate.  Dentition intact.    Neck: No adenopathy.  No thyromegaly. Carotids +4/4 bilaterally without bruits.  No jugular venous distension.   Heart: RRR. Normal S1, S2 splits physiologically. No murmur, rub, click, or gallop. The PMI is in the 5th ICS in the midclavicular line. There is no heave.    Lungs: CTA.  No ronchi, wheezes, rales.  No dullness to percussion.   Abdomen: Soft, nontender, nondistended. No organomegaly.  No bruits.   Extremities: No clubbing, cyanosis, or edema.  The pulses are +4/4 at the radial, brachial, femoral, popliteal, DP, and PT sites bilaterally.  No bruits are noted.  Neurologic: Alert and oriented to person/place/time, normal speech, gait and affect  Skin: No petechiae, purpura or rash.    Labs:      BMP RESULTS:  Lab Results   Component Value Date     12/29/2022     01/06/2020    POTASSIUM 4.3 12/29/2022    POTASSIUM 4.8 01/06/2020    CHLORIDE 106 12/29/2022    CHLORIDE 100 01/06/2020    CO2 26 12/29/2022    CO2 27 01/06/2020    ANIONGAP 8 12/29/2022     (H) 12/29/2022    GLC 83 01/06/2020    BUN 12.5 12/29/2022    BUN 20 01/06/2020    CR 0.99 12/29/2022    CR 1.27 01/06/2020    GFRESTIMATED 76 12/29/2022    GFRESTIMATED 74 01/06/2020    GFRESTBLACK 86 01/06/2020    RAÚL 9.2 12/29/2022    RAÚL 10.1 01/06/2020          Procedures:    EKG: normal sin rhythm     Assessment and Plan:     We discussed the results with patient.  We disucssed the importance of a heart healthy diet and lifestyle.    QTc stable on EKG today.  Patient has no longer on duloxetine.      I saw and evaluated patient with CV fellow. I examined patient with CV fellow. I discussed the results with patient and CV fellow. I discussed our plan with patient and CV fellow.  I agree with CV fellow's note and I edited the CV fellow's note to make it a more  comprehensive document.    Quintin Sheriff MD, PhD  Professor of Medicine  Division of Cardiology

## 2023-04-06 ENCOUNTER — OFFICE VISIT (OUTPATIENT)
Dept: CARDIOLOGY | Facility: CLINIC | Age: 38
End: 2023-04-06
Attending: INTERNAL MEDICINE
Payer: COMMERCIAL

## 2023-04-06 VITALS
SYSTOLIC BLOOD PRESSURE: 125 MMHG | BODY MASS INDEX: 39.68 KG/M2 | WEIGHT: 293 LBS | DIASTOLIC BLOOD PRESSURE: 84 MMHG | HEIGHT: 72 IN

## 2023-04-06 DIAGNOSIS — I10 BENIGN ESSENTIAL HYPERTENSION: ICD-10-CM

## 2023-04-06 DIAGNOSIS — I45.81 LONG Q-T SYNDROME: Primary | ICD-10-CM

## 2023-04-06 PROCEDURE — 93005 ELECTROCARDIOGRAM TRACING: CPT

## 2023-04-06 PROCEDURE — 99214 OFFICE O/P EST MOD 30 MIN: CPT | Mod: GC | Performed by: INTERNAL MEDICINE

## 2023-04-06 PROCEDURE — 99212 OFFICE O/P EST SF 10 MIN: CPT | Mod: 25 | Performed by: INTERNAL MEDICINE

## 2023-04-06 RX ORDER — HYDROCHLOROTHIAZIDE 25 MG/1
25 TABLET ORAL DAILY
Qty: 90 TABLET | Refills: 3 | Status: SHIPPED | OUTPATIENT
Start: 2023-04-06

## 2023-04-06 RX ORDER — LISINOPRIL 40 MG/1
40 TABLET ORAL DAILY
Qty: 90 TABLET | Refills: 3 | Status: SHIPPED | OUTPATIENT
Start: 2023-04-06

## 2023-04-06 RX ORDER — PROGESTERONE 100 MG/1
CAPSULE ORAL
COMMUNITY
Start: 2023-03-29

## 2023-04-06 RX ORDER — AMLODIPINE BESYLATE 10 MG/1
10 TABLET ORAL DAILY
Qty: 90 TABLET | Refills: 3 | Status: SHIPPED | OUTPATIENT
Start: 2023-04-06

## 2023-04-06 RX ORDER — METHYLPHENIDATE HYDROCHLORIDE 5 MG/1
TABLET ORAL
COMMUNITY
Start: 2023-03-20

## 2023-04-06 ASSESSMENT — PAIN SCALES - GENERAL: PAINLEVEL: NO PAIN (0)

## 2023-04-06 NOTE — NURSING NOTE
April 6, 2023      Medication Changes: None      Follow Up: With cardiology as needed. Pt to reach out to PCP for future refills.    Patient verbalized understanding of all health information given and agreed to call with further questions or concerns.      Katey Schrader RN

## 2023-04-06 NOTE — Clinical Note
4/6/2023      RE: Madelaine Jeffers  [address]  [facility] [address]       Dear Colleague,    Thank you for the opportunity to participate in the care of your patient, Madelaine Jeffers, at the [facility]. Please see a copy of my visit note below.    HPI:   Patient Juan Francisco Richardson is a 38 year old patient with a history of headaches, obesity, severe anxiety, depressive disorder, hyperventilation, gender dysphoria and eating disorder who presents  follow-up.  Last visit 11/1/2021.     Patient reports feeling better. Patient denies substernal chest pain, shortness of breath, orthopnea, PND  Patient  is compliant with her medications and denies significant side effects.  The reason of the outpatient visit that a new medication which may prolong QT interval and the question if would be safe taking into consideration that there was some previous QT prolongation.       PAST MEDICAL HISTORY:  Past Medical History:   Diagnosis Date     Anxiety      Chronic headache      Depression      Gender dysphoria      Hypertension        CURRENT MEDICATIONS:  Current Outpatient Medications   Medication Sig Dispense Refill     amLODIPine (NORVASC) 10 MG tablet Take 1 tablet (10 mg) by mouth daily 90 tablet 0     estradiol (ESTRACE) 2 MG tablet Take 2 mg by mouth 3 times daily       gabapentin (NEURONTIN) 100 MG capsule Take 100 mg by mouth 3 times daily       hydrochlorothiazide (HYDRODIURIL) 25 MG tablet Take 1 tablet (25 mg) by mouth daily Appointment 4/6/23 Labs done 12/29/22 90 tablet 1     ibuprofen (ADVIL/MOTRIN) 800 MG tablet TAKE ONE Tablet BY MOUTH EVERY 8 HOURS AS NEEDED FOR pain.  0     lisinopril (ZESTRIL) 40 MG tablet Take 1 tablet (40 mg) by mouth daily Appointment 4/6/23 Labs done 12/29/22 90 tablet 1     spironolactone (ALDACTONE) 25 MG tablet Take 1 tablet (25 mg) by mouth daily 90 tablet 3       PAST SURGICAL  HISTORY:  No past surgical history on file.    ALLERGIES     Allergies   Allergen Reactions     Red Dye Itching       FAMILY HISTORY:  No family history on file.    SOCIAL HISTORY:  Social History     Socioeconomic History     Marital status: Single     Spouse name: None     Number of children: None     Years of education: None     Highest education level: None   Occupational History     None   Tobacco Use     Smoking status: Former Smoker     Types: Cigarettes     Smokeless tobacco: Never Used   Substance and Sexual Activity     Alcohol use: Yes     Comment: once a month      Drug use: No     Sexual activity: None   Other Topics Concern     None   Social History Narrative     None     Social Determinants of Health     Financial Resource Strain:      Difficulty of Paying Living Expenses:    Food Insecurity:      Worried About Running Out of Food in the Last Year:      Ran Out of Food in the Last Year:    Transportation Needs:      Lack of Transportation (Medical):      Lack of Transportation (Non-Medical):    Physical Activity:      Days of Exercise per Week:      Minutes of Exercise per Session:    Stress:      Feeling of Stress :    Social Connections:      Frequency of Communication with Friends and Family:      Frequency of Social Gatherings with Friends and Family:      Attends Nondenominational Services:      Active Member of Clubs or Organizations:      Attends Club or Organization Meetings:      Marital Status:    Intimate Partner Violence:      Fear of Current or Ex-Partner:      Emotionally Abused:      Physically Abused:      Sexually Abused:        ROS:   Constitutional: No fever, chills, or sweats. No weight gain/loss   ENT: No visual disturbance, ear ache, epistaxis, sore throat  Allergies/Immunologic: Negative.   Respiratory: No cough, hemoptysia  Cardiovascular: As per HPI  GI: No nausea, vomiting, hematemesis, melena, or hematochezia  : No urinary frequency, dysuria, or hematuria  Integument:  Negative  Psychiatric: Negative  Neuro: Negative  Endocrinology: Negative   Musculoskeletal: Negative    EXAM:  There were no vitals taken for this visit.  In general, the patient is a pleasant adult in no apparent distress.    HEENT: NC/AT.  PERRLA.  EOMI.  Sclerae white, not injected.  Nares clear.  Pharynx without erythema or exudate.  Dentition intact.    Neck: No adenopathy.  No thyromegaly. Carotids +4/4 bilaterally without bruits.  No jugular venous distension.   Heart: RRR. Normal S1, S2 splits physiologically. No murmur, rub, click, or gallop. The PMI is in the 5th ICS in the midclavicular line. There is no heave.    Lungs: CTA.  No ronchi, wheezes, rales.  No dullness to percussion.   Abdomen: Soft, nontender, nondistended. No organomegaly.  No bruits.   Extremities: No clubbing, cyanosis, or edema.  The pulses are +4/4 at the radial, brachial, femoral, popliteal, DP, and PT sites bilaterally.  No bruits are noted.  Neurologic: Alert and oriented to person/place/time, normal speech, gait and affect  Skin: No petechiae, purpura or rash.    Labs:      BMP RESULTS:  Lab Results   Component Value Date     12/29/2022     01/06/2020    POTASSIUM 4.3 12/29/2022    POTASSIUM 4.8 01/06/2020    CHLORIDE 106 12/29/2022    CHLORIDE 100 01/06/2020    CO2 26 12/29/2022    CO2 27 01/06/2020    ANIONGAP 8 12/29/2022     (H) 12/29/2022    GLC 83 01/06/2020    BUN 12.5 12/29/2022    BUN 20 01/06/2020    CR 0.99 12/29/2022    CR 1.27 01/06/2020    GFRESTIMATED 76 12/29/2022    GFRESTIMATED 74 01/06/2020    GFRESTBLACK 86 01/06/2020    RAÚL 9.2 12/29/2022    RAÚL 10.1 01/06/2020          Procedures:    EKG: normal sin rhythm     Assessment and Plan:     We discussed the results with patient.  We disucssed the importance of a heart healthy diet and lifestyle.  At this moment there are no contra-indication based on the EKG today for the new medication that her physician wants to start.    Quintin Sheriff MD,  PhD  Professor of Medicine  Division of Cardiology        Please do not hesitate to contact me if you have any questions/concerns.     Sincerely,     Quintin Sheriff MD

## 2023-04-06 NOTE — PATIENT INSTRUCTIONS
April 6, 2023    Cardiology Provider You Saw During Your Visit: Dr. Sheriff      Medication Changes: None      Follow Up: With cardiology as needed. We refilled your cardiac meds for one year. After that, please reach out to your primary care provider.      Follow the American Heart Association Diet and Lifestyle Recommendations:  -Limit saturated fat, trans fat, sodium, red meat, sweets and sugar-sweetened beverages. If you choose to eat red meat, compare labels and select the leanest cuts available.  -Aim for at least 150 minutes of moderate physical activity or 75 minutes of vigorous physical activity - or an equal combination of both - each week.      To Reach Us:  -During business hours: [phone], press option # 1 to schedule an appointment or to leave a message for your care team.     -After hours, weekends or holidays: [phone], press option #4 and ask to speak to the on-call cardiologist. Inform them you are a patient at the [facility].      Katey Schrader RN  Cardiology Care Coordinator - General Cardiology  [facility]

## 2023-04-06 NOTE — NURSING NOTE
Chief Complaint   Patient presents with     Follow Up      Jaziel F/U     Vitals were taken, medications reconciled, and EKG was performed.    Magaly Purcell EMT  4:39 PM

## 2023-04-10 LAB
ATRIAL RATE - MUSE: 77 BPM
DIASTOLIC BLOOD PRESSURE - MUSE: NORMAL MMHG
INTERPRETATION ECG - MUSE: NORMAL
P AXIS - MUSE: 41 DEGREES
PR INTERVAL - MUSE: 156 MS
QRS DURATION - MUSE: 98 MS
QT - MUSE: 416 MS
QTC - MUSE: 470 MS
R AXIS - MUSE: 17 DEGREES
SYSTOLIC BLOOD PRESSURE - MUSE: NORMAL MMHG
T AXIS - MUSE: 23 DEGREES
VENTRICULAR RATE- MUSE: 77 BPM

## 2023-06-04 ENCOUNTER — HEALTH MAINTENANCE LETTER (OUTPATIENT)
Age: 38
End: 2023-06-04

## 2024-06-07 ENCOUNTER — MYC REFILL (OUTPATIENT)
Dept: CARDIOLOGY | Facility: CLINIC | Age: 39
End: 2024-06-07
Payer: COMMERCIAL

## 2024-06-07 DIAGNOSIS — I10 BENIGN ESSENTIAL HYPERTENSION: ICD-10-CM

## 2024-06-10 NOTE — TELEPHONE ENCOUNTER
amLODIPine (NORVASC) 10 MG tablet   Last Written Prescription Date:  4/6/2023  Last Fill Quantity: 90,   # refills: 3  Last Office Visit : 4/6/2023  Future Office visit:  none  Routing amLODIPine (NORVASC) 10 MG tablet refill request to provider for review/approval because: carloz refill ?- no PCP listed in chart  - plan last cardiology visit 4/6/2023: RTC as needed and PCP for future refills  - pended Rx for 30 day supply

## 2024-06-11 RX ORDER — AMLODIPINE BESYLATE 10 MG/1
10 TABLET ORAL DAILY
Qty: 30 TABLET | Refills: 0 | OUTPATIENT
Start: 2024-06-11

## 2024-07-14 ENCOUNTER — HEALTH MAINTENANCE LETTER (OUTPATIENT)
Age: 39
End: 2024-07-14

## 2025-07-19 ENCOUNTER — HEALTH MAINTENANCE LETTER (OUTPATIENT)
Age: 40
End: 2025-07-19